# Patient Record
Sex: FEMALE | Race: BLACK OR AFRICAN AMERICAN | Employment: UNEMPLOYED | RURAL
[De-identification: names, ages, dates, MRNs, and addresses within clinical notes are randomized per-mention and may not be internally consistent; named-entity substitution may affect disease eponyms.]

---

## 2019-04-29 ENCOUNTER — OFFICE VISIT (OUTPATIENT)
Dept: FAMILY MEDICINE CLINIC | Age: 54
End: 2019-04-29

## 2019-04-29 VITALS
HEIGHT: 68 IN | HEART RATE: 68 BPM | SYSTOLIC BLOOD PRESSURE: 154 MMHG | BODY MASS INDEX: 35.61 KG/M2 | DIASTOLIC BLOOD PRESSURE: 92 MMHG | TEMPERATURE: 97.9 F | OXYGEN SATURATION: 98 % | WEIGHT: 235 LBS | RESPIRATION RATE: 16 BRPM

## 2019-04-29 DIAGNOSIS — R01.1 HEART MURMUR: ICD-10-CM

## 2019-04-29 DIAGNOSIS — I10 ESSENTIAL HYPERTENSION: Primary | ICD-10-CM

## 2019-04-29 DIAGNOSIS — Z12.39 SCREENING FOR MALIGNANT NEOPLASM OF BREAST: ICD-10-CM

## 2019-04-29 DIAGNOSIS — Z12.11 SCREENING FOR MALIGNANT NEOPLASM OF COLON: ICD-10-CM

## 2019-04-29 DIAGNOSIS — G56.01 CARPAL TUNNEL SYNDROME OF RIGHT WRIST: ICD-10-CM

## 2019-04-29 PROBLEM — E66.01 SEVERE OBESITY (HCC): Status: ACTIVE | Noted: 2019-04-29

## 2019-04-29 RX ORDER — AMLODIPINE BESYLATE 5 MG/1
5 TABLET ORAL DAILY
Qty: 30 TAB | Refills: 3 | Status: SHIPPED | OUTPATIENT
Start: 2019-04-29 | End: 2019-05-10 | Stop reason: SINTOL

## 2019-04-29 RX ORDER — GLUCOSAMINE SULFATE 1500 MG
POWDER IN PACKET (EA) ORAL DAILY
COMMUNITY
Start: 2019-04-29

## 2019-04-29 NOTE — PATIENT INSTRUCTIONS
Lorrie Goodman with [facility]  [address] [place] [address], [facility] [address] [facility], [facility]  [phone]    Monitor blood pressure outside the office several times weekly at different times during the day and evening. Bring the record to me in 3 weeks for review. Blood Pressure Record     Patient Name:  ______________________ :  ______________________    Date/Time BP Reading Pulse                                                                                                                                                                                           Carpal Tunnel Syndrome: Care Instructions  Your Care Instructions    Carpal tunnel syndrome is a nerve problem. It can cause tingling, numbness, weakness, or pain in the fingers, thumb, and hand. The median nerve and several tough tissues called tendons run through a space in the wrist called the carpal tunnel. The repeated hand motions used in work and some hobbies and sports can put pressure on the nerve. Pregnancy and several conditions, including diabetes, arthritis, and an underactive thyroid, also can cause carpal tunnel syndrome. You may be able to limit an activity or do it differently to reduce your symptoms. You also can take other steps to feel better. If your symptoms are mild, 1 to 2 weeks of home treatment are likely to ease your pain. Surgery is needed only if other treatments do not work. Follow-up care is a key part of your treatment and safety. Be sure to make and go to all appointments, and call your doctor if you are having problems. It's also a good idea to know your test results and keep a list of the medicines you take. How can you care for yourself at home? · If possible, stop or reduce the activity that causes your symptoms. If you cannot stop the activity, take frequent breaks to rest and stretch or change hand positions to do a task.  Try switching hands, such as when using a computer mouse. · Try to avoid bending or twisting your wrists. · Ask your doctor if you can take an over-the-counter pain medicine, such as acetaminophen (Tylenol), ibuprofen (Advil, Motrin), or naproxen (Aleve). Be safe with medicines. Read and follow all instructions on the label. · If your doctor prescribes corticosteroid medicine to help reduce pain and swelling, take it exactly as prescribed. Call your doctor if you think you are having a problem with your medicine. · Put ice or a cold pack on your wrist for 10 to 20 minutes at a time to ease pain. Put a thin cloth between the ice and your skin. · If your doctor or your physical or occupational therapist tells you to wear a wrist splint, wear it as directed to keep your wrist in a neutral position. This also eases pressure on your median nerve. · Ask your doctor whether you should have physical or occupational therapy to learn how to do tasks differently. · Try a yoga class to stretch your muscles and build strength in your hands and wrists. Yoga has been shown to ease carpal tunnel symptoms. To prevent carpal tunnel  · When working at a computer, keep your hands and wrists in line with your forearms. Hold your elbows close to your sides. Take a break every 10 to 15 minutes. · Try these exercises:  ? Warm up: Rotate your wrist up, down, and from side to side. Repeat this 4 times. Stretch your fingers far apart, relax them, then stretch them again. Repeat 4 times. Stretch your thumb by pulling it back gently, holding it, and then releasing it. Repeat 4 times. ? Prayer stretch: Start with your palms together in front of your chest just below your chin. Slowly lower your hands toward your waistline while keeping your hands close to your stomach and your palms together until you feel a mild to moderate stretch under your forearms. Hold for 10 to 20 seconds. Repeat 4 times. ? Wrist flexor stretch: Hold your arm in front of you with your palm up.  [employer] your wrist, pointing your hand toward the floor. With your other hand, gently bend your wrist further until you feel a mild to moderate stretch in your forearm. Hold for 10 to 20 seconds. Repeat 4 times. ? Wrist extensor stretch: Repeat the steps for the wrist flexor stretch, but begin with your extended hand palm down. · Squeeze a rubber exercise ball several times a day to keep your hands and fingers strong. · Avoid holding objects (such as a book) in one position for a long time. When possible, use your whole hand to grasp an object. Using just the thumb and index finger can put stress on the wrist.  · Do not smoke. It can make this condition worse by reducing blood flow to the median nerve. If you need help quitting, talk to your doctor about stop-smoking programs and medicines. These can increase your chances of quitting for good. When should you call for help? Watch closely for changes in your health, and be sure to contact your doctor if:    · Your pain or other problems do not get better with home care.     · You want more information about physical or occupational therapy.     · You have side effects of your corticosteroid medicine, such as:  ? Weight gain. ? Mood changes. ? Trouble sleeping. ? Bruising easily.     · You have any other problems with your medicine. Where can you learn more? Go to http://eleanor-amor.info/. Enter R432 in the search box to learn more about \"Carpal Tunnel Syndrome: Care Instructions. \"  Current as of: September 20, 2018  Content Version: 11.9  © 7042-3056 [employer]. Care instructions adapted under license by [employer] (which disclaims liability or warranty for this information). If you have questions about a medical condition or this instruction, always ask your healthcare professional. [place] [address] any warranty or liability for your use of this information.

## 2019-04-29 NOTE — PROGRESS NOTES
CC: New patient, establish [facility]. HPI: Pt is a 53 y.o. female who presents for new patient, [facility]. She has noticed since moving back to [facility], her BP has been up. Values have been ranging in the 334'S systolic and she is unsure of the diastolics. She has a heart murmur that is related to a type of defect in the heart. She was followed by Dr. Kay Portillo [facility] for this but has not seen Cardiology in a long time. She has not had any symptoms of chest pain, SOB, leg swelling or orthopnea, but is requesting to see a Cardiologist just to establish with one here. She has also been having numbness/tingling in the right hand for the past 2 weeks. Symptoms eventually get better when she shakes the hand out. She has been lifting her father which is a new activity for her since moving home. She has noticed some weakness of the hand and trouble opening jars along with this. 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 History reviewed. No pertinent past medical history. No family history on file. Social History     Tobacco Use    Smoking status: Current Every Day Smoker    Smokeless tobacco: Never Used   Substance Use Topics    Alcohol use:  Yes    Drug use: Yes     Types: Marijuana       ROS:  Positive only when bolded  Constitutional: F/C, changes in weight (pt reports slow weight gain)  Eyes: Itching/draining, changes in vision (occ blurry vision that clears in a few seconds)  Ears, nose, mouth, throat, and face: Rhinorrhea, congestion (allergies)  Respiratory: SOB  Cardiovascular: CP, GREGORY  Integument/breast: Changes in skin, moles or hair, rash  Hematologic/lymphatic: SIMONE  Musculoskeletal: Myalgias, arthralgias (back, with lifting)  Neurological: Changes in gait, numbness/tingling  Psychiatric: Changes in mood    PE:  Visit Vitals  BP (!) 154/92 (BP 1 Location: Left arm, BP Patient Position: Sitting)   Pulse 68   Temp 97.9 °F (36.6 °C) (Oral)   Resp 16   Ht 5' 7.5\" (1.715 m)   Wt 235 lb (106.6 kg)   SpO2 98%   BMI 36.26 kg/m²     Gen: Pt sitting in chair, in NAD  Head: Normocephalic, atraumatic  Eyes: Sclera anicteric, EOM grossly intact, PERRL  Throat: MMM, normal lips, tongue and gums  Neck: Supple, no LAD, no thyromegaly or carotid bruits  CVS: Normal S1, S2, no m/r/g  Resp: CTAB, no wheezes or rales  Extrem: Atraumatic, no cyanosis or edema  MSK: Full AROM of wrists b/l. No muscle wasting of thenar components. Negative Phalen's and Tinnel's test on R.   Pulses: 2+   Skin: Warm, dry  Neuro: Alert, oriented, appropriate      A/P: Pt is a 53 y.o. female who presents for new patient, elevated BP and numbness/tingling in wrist, likely carpal tunnel syndrome  - Given elevated BP here and at home, will start medication. Discussed options and mutual decision made to start amlodipine 5mg daily  - Pt to continue working on diet and lifestyle modifications to bring down BP  - Brace given for carpal tunnel  - NSAIDs to help with pain and inflammation at the carpal tunnel  - Referral to Cardiology per her request  - Lipid panel  - CMP  - TSH  - Will request records from prior Cardiologist in [place] [address]  - Referral for mammogram  - FIT as pt not able to do colonoscopy now while caring for elderly parents  - RTC in 2 weeks for BP check and [address] with pap      Discussed diagnoses in detail with patient. Medication risks/benefits/side effects discussed with patient. All of the patient's questions were addressed. The patient understands and agrees with our plan of care. The patient knows to call back if they are unsure of or forget any changes we discussed today or if the symptoms change. The patient received an After-Visit Summary which contains VS, orders, medication list and allergy list. This can be used as a \"mini-medical record\" should they have to seek medical care while out of town.     No current outpatient medications on file prior to visit. No current facility-administered medications on file prior to visit.

## 2019-04-30 ENCOUNTER — TELEPHONE (OUTPATIENT)
Dept: FAMILY MEDICINE CLINIC | Age: 54
End: 2019-04-30

## 2019-04-30 LAB
ALBUMIN SERPL-MCNC: 4.1 G/DL (ref 3.5–5.5)
ALBUMIN/GLOB SERPL: 1.5 {RATIO} (ref 1.2–2.2)
ALP SERPL-CCNC: 65 IU/L (ref 39–117)
ALT SERPL-CCNC: 15 IU/L (ref 0–32)
AST SERPL-CCNC: 15 IU/L (ref 0–40)
BILIRUB SERPL-MCNC: 0.3 MG/DL (ref 0–1.2)
BUN SERPL-MCNC: 8 MG/DL (ref 6–24)
BUN/CREAT SERPL: 10 (ref 9–23)
CALCIUM SERPL-MCNC: 9.3 MG/DL (ref 8.7–10.2)
CHLORIDE SERPL-SCNC: 104 MMOL/L (ref 96–106)
CHOLEST SERPL-MCNC: 202 MG/DL (ref 100–199)
CO2 SERPL-SCNC: 24 MMOL/L (ref 20–29)
CREAT SERPL-MCNC: 0.83 MG/DL (ref 0.57–1)
GLOBULIN SER CALC-MCNC: 2.7 G/DL (ref 1.5–4.5)
GLUCOSE SERPL-MCNC: 90 MG/DL (ref 65–99)
HDLC SERPL-MCNC: 49 MG/DL
LDLC SERPL CALC-MCNC: 133 MG/DL (ref 0–99)
POTASSIUM SERPL-SCNC: 4.4 MMOL/L (ref 3.5–5.2)
PROT SERPL-MCNC: 6.8 G/DL (ref 6–8.5)
SODIUM SERPL-SCNC: 139 MMOL/L (ref 134–144)
TRIGL SERPL-MCNC: 101 MG/DL (ref 0–149)
TSH SERPL DL<=0.005 MIU/L-ACNC: 1.55 UIU/ML (ref 0.45–4.5)
VLDLC SERPL CALC-MCNC: 20 MG/DL (ref 5–40)

## 2019-04-30 NOTE — TELEPHONE ENCOUNTER
Pt returned call, she would like to do lifestyle and diet modifications for the next 3-6 months and then repeat lipid panel. All questions answered and pt feels comfortable with the plan of care.

## 2019-04-30 NOTE — TELEPHONE ENCOUNTER
Called to inform pt of recent results. Left message to call back. Kidney function, liver function, thyroid and electrolytes normal. Cholesterol elevated and 10 year ASCVD risk is 16%. Recommend intensive diet and lifestyle modifications vs starting statin. Will discuss with pt when she calls back.

## 2019-05-06 LAB — HEMOCCULT STL QL IA: NEGATIVE

## 2019-05-09 ENCOUNTER — TELEPHONE (OUTPATIENT)
Dept: FAMILY MEDICINE CLINIC | Age: 54
End: 2019-05-09

## 2019-05-09 NOTE — TELEPHONE ENCOUNTER
Pt requesting to speak w/ provider regarding her BP medication. She states that she feels really sick and she has been on them for 8 days.

## 2019-05-09 NOTE — TELEPHONE ENCOUNTER
Spoke with patient and she states since she has been taking the new blood pressure medication (amlodipine) she has been having a headache, nausea and chest pressure (worse at night). She has an appointment with Dr. Jaime Alvarez on 5/13/19 and does not want to stop the medication without letting her know. Please advise.

## 2019-05-10 NOTE — TELEPHONE ENCOUNTER
Returned pt's call. She can stop taking the amlodipine. Will discuss further options at her appt on Monday. All questions answered and pt feels comfortable with the plan of care.

## 2019-05-13 ENCOUNTER — HOSPITAL ENCOUNTER (OUTPATIENT)
Dept: LAB | Age: 54
Discharge: HOME | End: 2019-05-13
Payer: MEDICAID

## 2019-05-13 ENCOUNTER — TELEPHONE (OUTPATIENT)
Dept: FAMILY MEDICINE CLINIC | Age: 54
End: 2019-05-13

## 2019-05-13 ENCOUNTER — OFFICE VISIT (OUTPATIENT)
Dept: FAMILY MEDICINE CLINIC | Age: 54
End: 2019-05-13

## 2019-05-13 VITALS
HEART RATE: 72 BPM | TEMPERATURE: 97.3 F | DIASTOLIC BLOOD PRESSURE: 90 MMHG | RESPIRATION RATE: 16 BRPM | SYSTOLIC BLOOD PRESSURE: 145 MMHG | HEIGHT: 68 IN | OXYGEN SATURATION: 96 % | BODY MASS INDEX: 34.71 KG/M2 | WEIGHT: 229 LBS

## 2019-05-13 DIAGNOSIS — I10 ESSENTIAL HYPERTENSION: ICD-10-CM

## 2019-05-13 DIAGNOSIS — F17.200 TOBACCO DEPENDENCE: ICD-10-CM

## 2019-05-13 DIAGNOSIS — Z01.419 WELL WOMAN EXAM: Primary | ICD-10-CM

## 2019-05-13 PROCEDURE — 88175 CYTOPATH C/V AUTO FLUID REDO: CPT

## 2019-05-13 PROCEDURE — 87624 HPV HI-RISK TYP POOLED RSLT: CPT

## 2019-05-13 RX ORDER — LISINOPRIL 10 MG/1
10 TABLET ORAL DAILY
Qty: 30 TAB | Refills: 2 | Status: SHIPPED | OUTPATIENT
Start: 2019-05-13 | End: 2019-07-31 | Stop reason: SINTOL

## 2019-05-13 RX ORDER — BUPROPION HYDROCHLORIDE 150 MG/1
TABLET, EXTENDED RELEASE ORAL
Qty: 60 TAB | Refills: 3 | Status: SHIPPED | OUTPATIENT
Start: 2019-05-13 | End: 2019-07-31 | Stop reason: SDDI

## 2019-05-13 NOTE — PROGRESS NOTES
CC: MARIAELENA    HPI: Pt is a 53 y.o. female who presents for [facility]. She was started on amlodipine at her last visit but started to have chest pressure, nausea and HA. She stopped taking the amlodipine and the symptoms all resolved. She has been checking BP at home and they have continued to run 130-150's/70-90's per logs. Last pap: Around 2 years ago, no records  History of abnormal paps?: No  Abnormal vaginal bleeding or discharge?: No  Desire to be tested for STDs today?: No  LMP: 2018  Last mammogram: 2 years ago - order placed for [facility] but not scheduled yet  Self breast checks?: Yes  New lumps or bumps?: No  Family history of ovarian, uterine or breast cancer?: Maternal aunt has some type of cancer, possibly vaginal, and maternal 2 great aunts had breast cancer. Pt denies any physical, emotional or verbal abuse. Past Medical History:   Diagnosis Date    Heart murmur     Since birth    HTN (hypertension)        History reviewed. No pertinent family history. Social History     Tobacco Use    Smoking status: Current Every Day Smoker    Smokeless tobacco: Never Used   Substance Use Topics    Alcohol use: Yes    Drug use: Yes     Types: Marijuana         PE:  Visit Vitals  /90 (BP 1 Location: Right arm, BP Patient Position: Sitting)   Pulse 72   Temp 97.3 °F (36.3 °C) (Oral)   Resp 16   Ht 5' 7.5\" (1.715 m)   Wt 229 lb (103.9 kg)   SpO2 96%   BMI 35.34 kg/m²     Gen: Pt sitting in chair, in NAD  Head: Normocephalic, atraumatic  Eyes: Sclera anicteric, EOM grossly intact, PERRL  Throat: MMM, normal lips, tongue, teeth and gums  Neck: Supple, no LAD, no thyromegaly or carotid bruits  CVS: Normal S1, S2, slight end systolic murmur vs louder 2nd heart sound. Resp: CTAB, no wheezes or rales  Abd: Soft, non-tender, non-distended  : Normal external female genitalia. Vaginal mucosa pink, moist. Small  amount clear discharge. Cervix without visible lesions.   Extrem: Atraumatic, no cyanosis or edema  Pulses: 2+   Skin: Warm, dry  Neuro: Alert, oriented, appropriate    A/P: Pt is a 53 y.o. female who presents for [facility] and f/u HTN.  - Pap with HPV. Advised pt that if normal will screen every 5 years  - Discussed options for antihypertensives. She would prefer to avoid diuretics because she already urinates frequently. Will start low dose lisinopril with close f/u for BMP  - Discussed smoking cessation aid options. She would like to do a trial of Wellbutrin  - RTC in 1 month for f/u BP and smoking cessation, or sooner prn if BP's remain elevated after starting lisinopril    The patient was counseled on the dangers of tobacco use, and was advised to quit. Reviewed strategies to maximize success, including pharmacotherapy (Wellbutrin). Discussed diagnoses in detail with patient. Medication risks/benefits/side effects discussed with patient. All of the patient's questions were addressed. The patient understands and agrees with our plan of care. The patient knows to call back if they are unsure of or forget any changes we discussed today or if the symptoms change. The patient received an After-Visit Summary which contains VS, orders, medication list and allergy list. This can be used as a \"mini-medical record\" should they have to seek medical care while out of town. Current Outpatient Medications on File Prior to Visit   Medication Sig Dispense Refill    soy isofla-blk cohosh-mag bark (ESTROVEN) 155 mg cap Take  by mouth.  cholecalciferol (VITAMIN D3) 1,000 unit cap Take  by mouth daily.  calcium carbonate-vitamin D3 (CALTRATE 600 + D) 600 mg (1,500 mg)-800 unit chew Take  by mouth. No current facility-administered medications on file prior to visit.

## 2019-05-13 NOTE — TELEPHONE ENCOUNTER
An order for the patient to have a mammogram has been faxed to [facility] in [place]. Their office will contact the patient to schedule.

## 2019-05-13 NOTE — PROGRESS NOTES
1. Have you been to the ER, urgent care clinic since your last visit? Hospitalized since your last visit? no    2. Have you seen or consulted any other health care providers outside of the [address] [place] [address] since your last visit? Include any pap smears or colon screening. no  Reviewed record in preparation for visit and have obtained necessary documentation. Patient did not bring medications to visit for review. Information provided on Advanced Directive, Living Will. Body mass index is 35.34 kg/m².    Health Maintenance Due   Topic Date Due    Hepatitis C Screening  1965    Pneumococcal 0-64 years (1 of 1 - PPSV23) 04/27/1970    DTaP/Tdap/Td series (1 - Tdap) 04/27/1985    PAP AKA CERVICAL CYTOLOGY  04/27/1985    Shingrix Vaccine Age 50> (1 of 2) 04/27/2014    BREAST CANCER SCRN MAMMOGRAM  04/27/2014

## 2019-05-13 NOTE — PATIENT INSTRUCTIONS
A Healthy Lifestyle: Care Instructions  Your Care Instructions    A healthy lifestyle can help you feel good, stay at a healthy weight, and have plenty of energy for both work and play. A healthy lifestyle is something you can share with your whole family. A healthy lifestyle also can lower your risk for serious health problems, such as high blood pressure, heart disease, and diabetes. You can follow a few steps listed below to improve your health and the health of your family. Follow-up care is a key part of your treatment and safety. Be sure to make and go to all appointments, and call your doctor if you are having problems. It's also a good idea to know your test results and keep a list of the medicines you take. How can you care for yourself at home? · Do not eat too much sugar, fat, or fast foods. You can still have dessert and treats now and then. The goal is moderation. · Start small to improve your eating habits. Pay attention to portion sizes, drink less juice and soda pop, and eat more fruits and vegetables. ? Eat a healthy amount of food. A 3-ounce serving of meat, for example, is about the size of a deck of cards. Fill the rest of your plate with vegetables and whole grains. ? Limit the amount of soda and sports drinks you have every day. Drink more water when you are thirsty. ? Eat at least 5 servings of fruits and vegetables every day. It may seem like a lot, but it is not hard to reach this goal. A serving or helping is 1 piece of fruit, 1 cup of vegetables, or 2 cups of leafy, raw vegetables. Have an apple or some carrot sticks as an afternoon snack instead of a candy bar. Try to have fruits and/or vegetables at every meal.  · Make exercise part of your daily routine. You may want to start with simple activities, such as walking, bicycling, or slow swimming. Try to be active 30 to 60 minutes every day. You do not need to do all 30 to 60 minutes all at once.  For example, you can exercise 3 times a day for 10 or 20 minutes. Moderate exercise is safe for most people, but it is always a good idea to talk to your doctor before starting an exercise program.  · Keep moving. Gilberto Zoran the lawn, work in the garden, or [employer]. Take the stairs instead of the elevator at work. · If you smoke, quit. People who smoke have an increased risk for heart attack, stroke, cancer, and other lung illnesses. Quitting is hard, but there are ways to boost your chance of quitting tobacco for good. ? Use nicotine gum, patches, or lozenges. ? Ask your doctor about stop-smoking programs and medicines. ? Keep trying. In addition to reducing your risk of diseases in the future, you will notice some benefits soon after you stop using tobacco. If you have shortness of breath or asthma symptoms, they will likely get better within a few weeks after you quit. · Limit how much alcohol you drink. Moderate amounts of alcohol (up to 2 drinks a day for men, 1 drink a day for women) are okay. But drinking too much can lead to liver problems, high blood pressure, and other health problems. Family health  If you have a family, there are many things you can do together to improve your health. · Eat meals together as a family as often as possible. · Eat healthy foods. This includes fruits, vegetables, lean meats and dairy, and whole grains. · Include your family in your fitness plan. Most people think of activities such as jogging or tennis as the way to fitness, but there are many ways you and your family can be more active. Anything that makes you breathe hard and gets your heart pumping is exercise. Here are some tips:  ? Walk to do errands or to take your child to school or the bus.  ? Go for a family bike ride after dinner instead of watching TV. Where can you learn more? Go to http://eleanor-amor.info/. Enter Z951 in the search box to learn more about \"A Healthy Lifestyle: Care Instructions. \"  Current as of: September 11, 2018  Content Version: 11.9  © 1342-0990 [employer]. Care instructions adapted under license by [employer] (which disclaims liability or warranty for this information). If you have questions about a medical condition or this instruction, always ask your healthcare professional. [address] any warranty or liability for your use of this information.

## 2019-07-31 ENCOUNTER — OFFICE VISIT (OUTPATIENT)
Dept: CARDIOLOGY CLINIC | Age: 54
End: 2019-07-31

## 2019-07-31 ENCOUNTER — TELEPHONE (OUTPATIENT)
Dept: CARDIOLOGY CLINIC | Age: 54
End: 2019-07-31

## 2019-07-31 VITALS
HEIGHT: 68 IN | SYSTOLIC BLOOD PRESSURE: 144 MMHG | BODY MASS INDEX: 34.25 KG/M2 | HEART RATE: 75 BPM | TEMPERATURE: 98.5 F | DIASTOLIC BLOOD PRESSURE: 81 MMHG | RESPIRATION RATE: 16 BRPM | WEIGHT: 226 LBS | OXYGEN SATURATION: 97 %

## 2019-07-31 DIAGNOSIS — R06.02 SOB (SHORTNESS OF BREATH) ON EXERTION: ICD-10-CM

## 2019-07-31 DIAGNOSIS — I10 HYPERTENSION, ESSENTIAL: ICD-10-CM

## 2019-07-31 DIAGNOSIS — R00.2 HEART PALPITATIONS: ICD-10-CM

## 2019-07-31 DIAGNOSIS — R01.1 SYSTOLIC MURMUR: Primary | ICD-10-CM

## 2019-07-31 DIAGNOSIS — R53.83 FATIGUE, UNSPECIFIED TYPE: ICD-10-CM

## 2019-07-31 RX ORDER — ATENOLOL 25 MG/1
25 TABLET ORAL DAILY
Qty: 30 TAB | Refills: 0 | Status: SHIPPED | OUTPATIENT
Start: 2019-07-31 | End: 2019-09-01 | Stop reason: SDUPTHER

## 2019-07-31 NOTE — TELEPHONE ENCOUNTER
Attempted to reach patient by telephone to schedule echo at [facility] per Dr. Estiven Pena. A message was left for return call. Verified patient with two types of identifiers. Patient returned my call. Scheduled echo at [facility]. Patient verbalized understanding and will call with any other questions.

## 2019-07-31 NOTE — PROGRESS NOTES
1. Have you been to the ER, urgent care clinic since your last visit? Hospitalized since your last visit? No    2. Have you seen or consulted any other health care providers outside of the [address] [place] [address] since your last visit? Include any pap smears or colon screening.  No  Reviewed record in preparation for visit and have necessary documentation  Pt did not bring medication to office visit for review    Goals that were addressed and/or need to be completed during or after this appointment include   Health Maintenance Due   Topic Date Due    Hepatitis C Screening  1965    Pneumococcal 0-64 years (1 of 1 - PPSV23) 04/27/1970    DTaP/Tdap/Td series (1 - Tdap) 04/27/1985    Shingrix Vaccine Age 50> (1 of 2) 04/27/2014

## 2019-07-31 NOTE — PATIENT INSTRUCTIONS
Jillian Gallagher MD    [address]  Our office will call you to schedule an echocardiogram, if you do not hear back by Monday please call our office. Lorrie Goodman with [facility]  [address] [place] [address] Drive, [address] [facility], [address] [facility]  [phone]    Monitor blood pressure outside the office several times weekly at different times during the day and evening. Bring the record to me in 3 weeks for review. Blood Pressure Record     Patient Name:  ______________________ :  ______________________    Date/Time BP Reading Pulse                                                                                                                                                                                                Home Blood Pressure Test: About This Test  What is it? A home blood pressure test allows you to keep track of your blood pressure at home. Blood pressure is a measure of the force of blood against the walls of your arteries. Blood pressure readings include two numbers, such as 130/80 (say \"130 over 80\"). The first number is the systolic pressure. The second number is the diastolic pressure. Why is this test done? You may do this test at home to:  · Find out if you have high blood pressure. · Track your blood pressure if you have high blood pressure. · Track how well medicine is working to reduce high blood pressure. · Check how lifestyle changes, such as weight loss and exercise, are affecting blood pressure. How can you prepare for the test?  · Do not use caffeine, tobacco, or medicines known to raise blood pressure (such as nasal decongestant sprays) for at least 30 minutes before taking your blood pressure. · Do not exercise for at least 30 minutes before taking your blood pressure. What happens before the test?  Take your blood pressure while you feel comfortable and relaxed.  Sit quietly with both feet on the floor for at least 5 minutes before the test.  What happens during the test?  · Sit with your arm slightly bent and resting on a table so that your upper arm is at the same level as your heart. · Roll up your sleeve or take off your shirt to expose your upper arm. · Wrap the blood pressure cuff around your upper arm so that the lower edge of the cuff is about 1 inch above the bend of your elbow. Proceed with the following steps depending on if you are using an automatic or manual pressure monitor. Automatic blood pressure monitors  · Press the on/off button on the automatic monitor and wait until the ready-to-measure \"heart\" symbol appears next to zero in the display window. · Press the start button. The cuff will inflate and deflate by itself. · Your blood pressure numbers will appear on the screen. · Write your numbers in your log book, along with the date and time. Manual blood pressure monitors  · Place the earpieces of a stethoscope in your ears, and place the bell of the stethoscope over the artery, just below the cuff. · Close the valve on the rubber inflating bulb. · Squeeze the bulb rapidly with your opposite hand to inflate the cuff until the dial or column of mercury reads about 30 mm Hg higher than your usual systolic pressure. If you do not know your usual pressure, inflate the cuff to 210 mm Hg or until the pulse at your wrist disappears. · Open the pressure valve just slightly by twisting or pressing the valve on the bulb. · As you watch the pressure slowly fall, note the level on the dial at which you first start to hear a pulsing or tapping sound through the stethoscope. This is your systolic blood pressure. · Continue letting the air out slowly. The sounds will become muffled and will finally disappear. Note the pressure when the sounds completely disappear. This is your diastolic blood pressure. Let out all the remaining air.   · Write your numbers in your log book, along with the date and time. What else should you know about the test?  It is more accurate to take the average of several readings made throughout the day than to rely on a single reading. It's normal for blood pressure to go up and down throughout the day. Follow-up care is a key part of your treatment and safety. Be sure to make and go to all appointments, and call your doctor if you are having problems. It's also a good idea to keep a list of the medicines you take. Where can you learn more? Go to http://eleanor-amor.info/. Enter C427 in the search box to learn more about \"Home Blood Pressure Test: About This Test.\"  Current as of: July 22, 2018  Content Version: 12.1  © 5581-9323 [employer]wise, [employer]. Care instructions adapted under license by [employer] (which disclaims liability or warranty for this information). If you have questions about a medical condition or this instruction, always ask your healthcare professional. [address] any warranty or liability for your use of this information.
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
## 2019-08-22 ENCOUNTER — TELEPHONE (OUTPATIENT)
Dept: FAMILY MEDICINE CLINIC | Age: 54
End: 2019-08-22

## 2019-08-22 DIAGNOSIS — Z87.74 ADULT PATIENT WITH HISTORY OF CONGENITAL HEART DISEASE: Primary | ICD-10-CM

## 2019-08-22 NOTE — TELEPHONE ENCOUNTER
Telephone Encounter        Caller's first/last name:  Self      Relationship to patient and are they on HIPAA:  Self      Did you check for a duplicate Encounter? Yes      Reason for call:  Request for medication      Further clarification of call: Pt states that she is schedule to have a dental procedure done on Monday. She needs a Rx for Amoxicillin sent to [facility] in [place]. Pt states that she has a heart murmur and unable to do any dental procedures w/out Amoxicillin. Does caller want a return call? Yes, when Rx has been sent to pharmacy.     Best contact number:  [phone]

## 2019-08-23 PROBLEM — Z87.74 ADULT PATIENT WITH HISTORY OF CONGENITAL HEART DISEASE: Status: ACTIVE | Noted: 2019-08-23

## 2019-08-23 RX ORDER — AMOXICILLIN 500 MG/1
2000 CAPSULE ORAL ONCE
Qty: 4 CAP | Refills: 3 | Status: SHIPPED | OUTPATIENT
Start: 2019-08-23 | End: 2019-12-04 | Stop reason: SDUPTHER

## 2019-08-23 NOTE — TELEPHONE ENCOUNTER
Returned pt's call. 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 She is going to have a dental procedure on Monday and has always had to take 2000mg of amoxicillin prior to dental procedures in the past. Discussed current recommendations for who requires antibiotic ppx prior to dental procedures and agreed that given her somewhat unclear history, will err on the side of caution and use ppx. Will send in rx to pt's pharmacy. All questions answered and pt feels comfortable with the plan of care.

## 2019-09-03 RX ORDER — ATENOLOL 25 MG/1
25 TABLET ORAL DAILY
Qty: 90 TAB | Refills: 0 | Status: SHIPPED | OUTPATIENT
Start: 2019-09-03 | End: 2019-09-30 | Stop reason: SDUPTHER

## 2019-09-03 NOTE — TELEPHONE ENCOUNTER
Request for atenolol 25mg daily. Last office visit 7-31-19, next office visit needs to be scheduled.  Refills per verbal order from Dr. Tay Dhaliwal.

## 2019-09-05 ENCOUNTER — OFFICE VISIT (OUTPATIENT)
Dept: FAMILY MEDICINE CLINIC | Age: 54
End: 2019-09-05

## 2019-09-05 VITALS
WEIGHT: 232 LBS | SYSTOLIC BLOOD PRESSURE: 138 MMHG | TEMPERATURE: 98.2 F | DIASTOLIC BLOOD PRESSURE: 80 MMHG | OXYGEN SATURATION: 100 % | RESPIRATION RATE: 16 BRPM | HEART RATE: 58 BPM | HEIGHT: 67 IN | BODY MASS INDEX: 36.41 KG/M2

## 2019-09-05 DIAGNOSIS — Z11.59 NEED FOR HEPATITIS C SCREENING TEST: ICD-10-CM

## 2019-09-05 DIAGNOSIS — Z23 ENCOUNTER FOR IMMUNIZATION: ICD-10-CM

## 2019-09-05 DIAGNOSIS — N89.8 VAGINAL DRYNESS: Primary | ICD-10-CM

## 2019-09-05 DIAGNOSIS — I10 ESSENTIAL HYPERTENSION: ICD-10-CM

## 2019-09-05 DIAGNOSIS — E78.2 MIXED HYPERLIPIDEMIA: ICD-10-CM

## 2019-09-05 RX ORDER — ESTRADIOL 0.1 MG/G
CREAM VAGINAL
Qty: 42.5 G | Refills: 3 | Status: SHIPPED | OUTPATIENT
Start: 2019-09-05 | End: 2020-02-03

## 2019-09-05 NOTE — PATIENT INSTRUCTIONS
Donepezil (By mouth)   Donepezil (hiv-NVX-a-zil)  Treats Alzheimer disease. Brand Name(s): Aricept   There may be other brand names for this medicine. When This Medicine Should Not Be Used: This medicine is not right for everyone. Do not use it if you had an allergic reaction to donepezil or to medicines that contain piperidine. How to Use This Medicine:   Tablet, Dissolving Tablet  · Your doctor will tell you how much medicine to use. Do not use more than directed. · Read and follow the patient instructions that come with this medicine. Talk to your doctor or pharmacist if you have any questions. · Tablet: Swallow the 23-mg tablet whole. Do not crush, break, or chew it. · Disintegrating tablet:Make sure your hands are dry before you handle the disintegrating tablet. Peel back the foil from the blister pack, then remove the tablet. Do not push the tablet through the foil. Place the tablet in your mouth. After it has melted, swallow or take a drink of water. · Missed dose: Skip the missed dose and take your next dose at the regular time. Do not take extra medicine to make up for a missed dose. · Store the medicine in a closed container at room temperature, away from heat, moisture, and direct light. Drugs and Foods to Avoid:   Ask your doctor or pharmacist before using any other medicine, including over-the-counter medicines, vitamins, and herbal products. · Some medicines can affect how donepezil works. Tell your doctor if you are using any of the following:   ¨ Bethanechol, carbamazepine, dexamethasone, ketoconazole, phenobarbital, phenytoin, quinidine, or rifampin  ¨ NSAID pain or arthritis medicine (such as aspirin, diclofenac, ibuprofen, naproxen)  Warnings While Using This Medicine:   · Tell your doctor if you are pregnant or breastfeeding, or if you have heart disease, lung disease, asthma or other breathing problems, stomach ulcers or bleeding, or trouble urinating.   · This medicine may cause the following problems:   ¨ Slow heartbeat  ¨ Stomach or bowel bleeding  ¨ Seizures  · Tell any doctor or dentist who treats you that you are using this medicine. You may need to stop using this medicine several days before you have surgery or medical tests. · Your doctor will check your progress and the effects of this medicine at regular visits. Keep all appointments. · Keep all medicine out of the reach of children. Never share your medicine with anyone. Possible Side Effects While Using This Medicine:   Call your doctor right away if you notice any of these side effects:  · Allergic reaction: Itching or hives, swelling in your face or hands, swelling or tingling in your mouth or throat, chest tightness, trouble breathing  · Bloody or black, tarry stools  · Change in how much or how often you urinate  · Chest pain, slow or uneven heartbeat, trouble breathing  · Lightheadedness, dizziness, fainting  · Seizures  · Severe stomach pain  · Unusual bleeding, bruising, or weakness  · Vomiting of blood or material that looks like coffee grounds  If you notice these less serious side effects, talk with your doctor:   · Mild nausea, vomiting, or diarrhea  · Weight loss  If you notice other side effects that you think are caused by this medicine, tell your doctor. Call your doctor for medical advice about side effects. You may report side effects to FDA at 4-038-EOW-5100  © 2017 [facility] Information is for End User's use only and may not be sold, redistributed or otherwise used for commercial purposes. The above information is an  only. It is not intended as medical advice for individual conditions or treatments. Talk to your doctor, nurse or pharmacist before following any medical regimen to see if it is safe and effective for you.

## 2019-09-05 NOTE — PROGRESS NOTES
1. Have you been to the ER, urgent care clinic since your last visit? Hospitalized since your last visit? no    2. Have you seen or consulted any other health care providers outside of the [address] [place] [address] since your last visit? Include any pap smears or colon screening. no  Reviewed record in preparation for visit and have obtained necessary documentation. Patient did not bring medications to visit for review. Information provided on Advanced Directive, Living Will. Body mass index is 36.34 kg/m².    Health Maintenance Due   Topic Date Due    Hepatitis C Screening  1965    Pneumococcal 0-64 years (1 of 1 - PPSV23) 04/27/1970    DTaP/Tdap/Td series (1 - Tdap) 04/27/1985    Shingrix Vaccine Age 50> (1 of 2) 04/27/2014    Influenza Age 5 to Adult  08/01/2019

## 2019-09-05 NOTE — PROGRESS NOTES
CC: Menopausal symptoms and f/u HTN    HPI: Pt is a 53 y.o. female who presents for menopausal symptoms and f/u HTN. She has an unclear h/o congenital heart defect and murmur, and had been having occasional palpitations, so was sent to Cardiology at her last visit. She had also had some side effects to first amlodipine and then lisinopril. She saw Cards on 7/31, notes personally reviewed. They started her on atenolol for BP and palpitations. They ordered and echo, which I reviewed with the patient today. It showed trace regurgitation at the mitral and tricuspid valves, with normal EF and slightly dilated L atrium. Since starting the atenolol she has not checked her BP but reports that the palpitations have resolved and she is tolerating the medication well. She also reports an increase in hot flashes and vaginal dryness. She had been taking an OTC supplement similar to [place] which was really helping for several months but lately does not seem to help anymore. She has never taken oral hormone replacement and is wary of taking any pills as she has had bad reactions to several (see above). She tried Wellbutrin once as well to help with smoking cessation, but shortly after that states she ended up in the ER very sick and they thought it may have been related to the Wellbutrin. We don't have records from this visit. Her last lipid panel was elevated and the plan at that time was to work on diet and lifestyle modifications and repeat in 3-6 months. Lab Results   Component Value Date/Time    Cholesterol, total 202 (H) 04/29/2019 02:21 PM    HDL Cholesterol 49 04/29/2019 02:21 PM    LDL, calculated 133 (H) 04/29/2019 02:21 PM    VLDL, calculated 20 04/29/2019 02:21 PM    Triglyceride 101 04/29/2019 02:21 PM       Past Medical History:   Diagnosis Date    Heart murmur     Since birth    HTN (hypertension)        History reviewed. No pertinent family history.     Social History     Tobacco Use    Smoking status: Current Every Day Smoker    Smokeless tobacco: Never Used   Substance Use Topics    Alcohol use: Yes    Drug use: Yes     Types: Marijuana       ROS:  Positive only when bolded  Constitutional: F/C  Eyes: Changes in vision  Ears, nose, mouth, throat, and face: Rhinorrhea, congestion  Respiratory: Cough  Cardiovascular: Palpitations  Genitourinary: Vaginal dryness  Integument/breast: Changes in skin  Endocrine: Hot flashes  Hematologic/lymphatic: SIMONE  Neurological: Changes in gait  Psychiatric: Changes in mood (more short tempered lately)    PE:  Visit Vitals  /80 (BP 1 Location: Left arm, BP Patient Position: Sitting)   Pulse (!) 58   Temp 98.2 °F (36.8 °C) (Oral)   Resp 16   Ht 5' 7\" (1.702 m)   Wt 232 lb (105.2 kg)   SpO2 100%   BMI 36.34 kg/m²     Gen: Pt sitting in chair, in NAD  Head: Normocephalic, atraumatic  Eyes: Sclera anicteric, EOM grossly intact, PERRL  Throat: MMM, normal lips, tongue and gums  Neck: Supple, no LAD, no thyromegaly or carotid bruits  CVS: Normal S1, S2, no m/r/g  Resp: CTAB, no wheezes or rales  Extrem: Atraumatic, no cyanosis or edema  Pulses: 2+   Skin: Warm, dry  Neuro: Alert, oriented, appropriate      A/P:   Encounter Diagnoses     ICD-10-CM ICD-9-CM   1. Vaginal dryness N89.8 625.8   2. Encounter for immunization Z23 V03.89   3. Essential hypertension I10 401.9   4. Need for hepatitis C screening test Z11.59 V73.89   5. Mixed hyperlipidemia E78.2 272.2     1. Vaginal dryness/menopausal symptoms: Discussed options with pt, she would prefer to stick with the vaginal estrogen cream for now to help with dryness and work on lifestyle modifications to help with the hot flashes.   - estradiol (ESTRACE) 0.01 % (0.1 mg/gram) vaginal cream; Use 2g intravaginally daily for the first two weeks. Then reduce to 1g intravaginally daily. Dispense: 42.5 g; Refill: 3    2.  Encounter for immunization  - diph,Pertuss,Acell,,Tet Vac-PF (ADACEL) 2 Lf-(2.5-5-3-5 mcg)-5Lf/0.5 mL susp; 0.5 mL by IntraMUSCular route once for 1 dose. Dispense: 1 Syringe; Refill: 0  - pneumococcal 23-valent (PNEUMOVAX 23) 25 mcg/0.5 mL injection; 0.5 mL by IntraMUSCular route once for 1 dose. Dispense: 0.5 mL; Refill: 0  - varicella-zoster recombinant, PF, (SHINGRIX, PF,) 50 mcg/0.5 mL susr injection; 0.5 mL by IntraMUSCular route once for 1 dose. Dispense: 0.5 mL; Refill: 0  - ADMIN INFLUENZA VIRUS VAC  - INFLUENZA VIRUS VAC QUAD,SPLIT,PRESV FREE SYRINGE IM    3. Essential hypertension: BP looks good today on atenolol and pt tolerating the medication well. Will leave at current dose for now  - METABOLIC PANEL, BASIC    4. Need for hepatitis C screening test  - HEPATITIS C AB    5. Mixed hyperlipidemia: If still with ASCVD risk >10% will call to discuss options  - LIPID PANEL     RTC in 6 months for f/u chronic conditions, or sooner prn    Discussed diagnoses in detail with patient. Medication risks/benefits/side effects discussed with patient. All of the patient's questions were addressed. The patient understands and agrees with our plan of care. The patient knows to call back if they are unsure of or forget any changes we discussed today or if the symptoms change. The patient received an After-Visit Summary which contains VS, orders, medication list and allergy list. This can be used as a \"mini-medical record\" should they have to seek medical care while out of town. Current Outpatient Medications on File Prior to Visit   Medication Sig Dispense Refill    atenolol (TENORMIN) 25 mg tablet Take 1 Tab by mouth daily. 90 Tab 0    cholecalciferol (VITAMIN D3) 1,000 unit cap Take  by mouth daily.  calcium carbonate-vitamin D3 (CALTRATE 600 + D) 600 mg (1,500 mg)-800 unit chew Take  by mouth. No current facility-administered medications on file prior to visit.

## 2019-09-06 LAB
BUN SERPL-MCNC: 11 MG/DL (ref 6–24)
BUN/CREAT SERPL: 14 (ref 9–23)
CALCIUM SERPL-MCNC: 9.4 MG/DL (ref 8.7–10.2)
CHLORIDE SERPL-SCNC: 103 MMOL/L (ref 96–106)
CHOLEST SERPL-MCNC: 161 MG/DL (ref 100–199)
CO2 SERPL-SCNC: 22 MMOL/L (ref 20–29)
CREAT SERPL-MCNC: 0.78 MG/DL (ref 0.57–1)
GLUCOSE SERPL-MCNC: 83 MG/DL (ref 65–99)
HCV AB S/CO SERPL IA: <0.1 S/CO RATIO (ref 0–0.9)
HDLC SERPL-MCNC: 49 MG/DL
LDLC SERPL CALC-MCNC: 96 MG/DL (ref 0–99)
POTASSIUM SERPL-SCNC: 4.5 MMOL/L (ref 3.5–5.2)
SODIUM SERPL-SCNC: 142 MMOL/L (ref 134–144)
TRIGL SERPL-MCNC: 82 MG/DL (ref 0–149)
VLDLC SERPL CALC-MCNC: 16 MG/DL (ref 5–40)

## 2019-09-30 RX ORDER — ATENOLOL 25 MG/1
25 TABLET ORAL DAILY
Qty: 90 TAB | Refills: 1 | Status: SHIPPED | OUTPATIENT
Start: 2019-09-30 | End: 2020-05-28

## 2019-09-30 NOTE — TELEPHONE ENCOUNTER
Request for atenolol 25mg daily. Last office visit 7-31-19, next office visit needs to be scheduled.  Refills per verbal order from Dr. Miryam Bruno.

## 2019-10-01 ENCOUNTER — TELEPHONE (OUTPATIENT)
Dept: FAMILY MEDICINE CLINIC | Age: 54
End: 2019-10-01

## 2019-10-01 NOTE — TELEPHONE ENCOUNTER
Returned call to pt. Pt made aware that we do not carry the shingles vaccine. Pt advised she could try the health department. Pt given address and number to [facility] dept. If they have the vaccine, pt will get both the shingles and pneumonia there. If not, pt will call back.

## 2019-10-01 NOTE — TELEPHONE ENCOUNTER
Caller's first/last name:  Lexy Pedraza    Reason for call:  immunizations      Does caller want a return call?  yes      Best contact number:  [phone]      Further clarification of call:      Patient called back and stated that she had been to [facility] to get her Pneumonia and Shingles vaccine but was told because of the type of insurance that she has, it has to be done at the doctor's office. Please call her back to discuss at [phone]. Patient states she would like to get these vaccines today.

## 2019-10-23 NOTE — TELEPHONE ENCOUNTER
----- Message from Britni Flores sent at 10/23/2019  2:32 PM EDT -----  Regarding: Dr. Natalia Acevedo Message/Vendor Calls    Caller's first and last name:      Reason for call: Pt stated she is calling about her mothers medications.       Callback required yes/no and why: yes      Best contact number(s): [phone]      Details to clarify the request:      Britni Flores

## 2019-12-04 DIAGNOSIS — Z87.74 ADULT PATIENT WITH HISTORY OF CONGENITAL HEART DISEASE: ICD-10-CM

## 2019-12-04 RX ORDER — AMOXICILLIN 500 MG/1
CAPSULE ORAL
Qty: 4 CAP | Refills: 0 | Status: SHIPPED | OUTPATIENT
Start: 2019-12-04 | End: 2020-02-28

## 2020-02-11 DIAGNOSIS — N95.2 POSTMENOPAUSAL ATROPHIC VAGINITIS: Primary | ICD-10-CM

## 2020-02-27 ENCOUNTER — TELEPHONE (OUTPATIENT)
Dept: FAMILY MEDICINE CLINIC | Age: 55
End: 2020-02-27

## 2020-02-27 DIAGNOSIS — Z87.74 ADULT PATIENT WITH HISTORY OF CONGENITAL HEART DISEASE: ICD-10-CM

## 2020-02-27 NOTE — TELEPHONE ENCOUNTER
----- Message from Baron Mims sent at 2/27/2020 10:29 AM EST -----  Regarding: Sharon Mcmahan MD  Level 1/Escalated Issue      Caller's first and last name and relationship (if not the patient):      Best contact number(s): [phone]      What are the symptoms: Pain in right and left hand      Transfer successful - yes/no (include outcome): No      Transfer declined - yes/no (include reason): N/A      Was caller advised to seek appropriate level of care - yes/no:      Details to clarify the request:        Baron Mims

## 2020-02-27 NOTE — TELEPHONE ENCOUNTER
Received staff message. Called pt and Envera already scheduled appt for Monday. Pt needed no further assistance.

## 2020-02-28 RX ORDER — AMOXICILLIN 500 MG/1
CAPSULE ORAL
Qty: 4 CAP | Refills: 0 | Status: SHIPPED | OUTPATIENT
Start: 2020-02-28 | End: 2020-09-08

## 2020-03-02 ENCOUNTER — OFFICE VISIT (OUTPATIENT)
Dept: FAMILY MEDICINE CLINIC | Age: 55
End: 2020-03-02

## 2020-03-02 VITALS
WEIGHT: 232 LBS | RESPIRATION RATE: 16 BRPM | SYSTOLIC BLOOD PRESSURE: 139 MMHG | HEIGHT: 67 IN | OXYGEN SATURATION: 99 % | DIASTOLIC BLOOD PRESSURE: 75 MMHG | HEART RATE: 64 BPM | TEMPERATURE: 97.4 F | BODY MASS INDEX: 36.41 KG/M2

## 2020-03-02 DIAGNOSIS — G56.22 ULNAR NEUROPATHY OF LEFT UPPER EXTREMITY: ICD-10-CM

## 2020-03-02 DIAGNOSIS — G56.01 CARPAL TUNNEL SYNDROME OF RIGHT WRIST: Primary | ICD-10-CM

## 2020-03-02 DIAGNOSIS — I10 ESSENTIAL HYPERTENSION: ICD-10-CM

## 2020-03-02 RX ORDER — DICLOFENAC SODIUM 50 MG/1
50 TABLET, DELAYED RELEASE ORAL
Qty: 60 TAB | Refills: 1 | Status: SHIPPED | OUTPATIENT
Start: 2020-03-02 | End: 2021-05-21

## 2020-03-02 NOTE — PROGRESS NOTES
CC: Numbness and tingling    HPI: Pt is a 53 y.o. female who presents for numbness and tingling in the 4th and 5th digits of the left hand. Has been going on for the past 2 weeks. She has a h/o carpal tunnel syndrome in the right hand and has been using a brace which helps but the fingers seem to be staying numb longer. She tried some cold gel that her mother bought and that helped. She has never tried NSAIDs or exercises. She does try to modify her activity on the right side to avoid positions that put pressure on the carpal tunnel. She also reports a cracking sound in her neck when she moves it from side to side, but it does not hurt. Past Medical History:   Diagnosis Date    Heart murmur     Since birth    HTN (hypertension)        History reviewed. No pertinent family history. Social History     Tobacco Use    Smoking status: Current Every Day Smoker    Smokeless tobacco: Never Used   Substance Use Topics    Alcohol use: Yes    Drug use: Yes     Types: Marijuana       ROS:  Positive only when bolded  Constitutional: F/C, changes in weight  Musculoskeletal: Myalgias, arthralgias  Neurological: Changes in gait, numbness/tingling    PE:  Visit Vitals  /75 (BP 1 Location: Left arm, BP Patient Position: Sitting)   Pulse 64   Temp 97.4 °F (36.3 °C) (Oral)   Resp 16   Ht 5' 7\" (1.702 m)   Wt 232 lb (105.2 kg)   SpO2 99%   BMI 36.34 kg/m²     Gen: Pt sitting in chair, in NAD  Head: Normocephalic, atraumatic  Eyes: Sclera anicteric, EOM grossly intact, PERRL  Throat: MMM, normal lips, tongue and gums  Neck: Supple  CVS: Normal S1, S2, no m/r/g  Resp: CTAB, no wheezes or rales  MSK: No swelling or muscle wasting in hands b/l. Normal strength and sensation in hands b/l. Full AROM of neck without pain. Extrem: Atraumatic, no cyanosis or edema  Pulses: 2+   Skin: Warm, dry  Neuro: Alert, oriented, appropriate      A/P:   Encounter Diagnoses     ICD-10-CM ICD-9-CM   1.  Carpal tunnel syndrome of right wrist G56.01 354.0   2. Essential hypertension I10 401.9   3. Ulnar neuropathy of left upper extremity G56.22 354.2     1. Carpal tunnel syndrome of right wrist: Discussed options with pt, will do trial of NSAID in addition to exercises. She will continue to use the splint and modify her activities to avoid exacerbating this. She can use her mother's cold spray. If symptoms not improving, will consider injection or referral to Ortho but she would prefer to hold off on these at this time. - diclofenac EC (VOLTAREN) 50 mg EC tablet; Take 1 Tab by mouth two (2) times daily as needed for Pain. Dispense: 60 Tab; Refill: 1    2. Essential hypertension: Well controlled, will get routine labs. - METABOLIC PANEL, COMPREHENSIVE; Future  - LIPID PANEL; Future    3. Ulnar neuropathy of left upper extremity: Discussed with pt, difficult to tell site of entrapment, but could be her neck given the popping sounds she hears with rotation of the neck. Will do conservative therapy with exercises and NSAIDs and if no improvement will send to Ortho for further evaluation. RTC in 6 months for f/u chronic conditions, or sooner prn    Discussed diagnoses in detail with patient. Medication risks/benefits/side effects discussed with patient. All of the patient's questions were addressed. The patient understands and agrees with our plan of care. The patient knows to call back if they are unsure of or forget any changes we discussed today or if the symptoms change. The patient received an After-Visit Summary which contains VS, orders, medication list and allergy list. This can be used as a \"mini-medical record\" should they have to seek medical care while out of town. Current Outpatient Medications on File Prior to Visit   Medication Sig Dispense Refill    conjugated estrogens (PREMARIN) 0.625 mg/gram vaginal cream Insert 0.5 g into vagina daily. 30 g 3    atenolol (TENORMIN) 25 mg tablet Take 1 Tab by mouth daily.  90 Tab 1    cholecalciferol (VITAMIN D3) 1,000 unit cap Take  by mouth daily.  calcium carbonate-vitamin D3 (CALTRATE 600 + D) 600 mg (1,500 mg)-800 unit chew Take  by mouth.  amoxicillin (AMOXIL) 500 mg capsule TAKE 4 CAPSULES BY MOUTH FOR 1 DOSE (GENERIC AMOXIL) 4 Cap 0     No current facility-administered medications on file prior to visit.

## 2020-03-02 NOTE — PATIENT INSTRUCTIONS
Right Hand: Carpal Tunnel Syndrome: Exercises  Introduction  Here are some examples of exercises for you to try. The exercises may be suggested for a condition or for rehabilitation. Start each exercise slowly. Ease off the exercises if you start to have pain. You will be told when to start these exercises and which ones will work best for you. Warm-up stretches  When you no longer have pain or numbness, you can do exercises to help prevent carpal tunnel syndrome from coming back. Do not do any stretch or movement that is uncomfortable or painful. 1. Rotate your wrist up, down, and from side to side. Repeat 4 times. 2. Stretch your fingers far apart. Relax them, and then stretch them again. Repeat 4 times. 3. Stretch your thumb by pulling it back gently, holding it, and then releasing it. Repeat 4 times. How to do the exercises  Prayer stretch    1. Start with your palms together in front of your chest just below your chin. 2. Slowly lower your hands toward your waistline, keeping your hands close to your stomach and your palms together until you feel a mild to moderate stretch under your forearms. 3. Hold for at least 15 to 30 seconds. Repeat 2 to 4 times. Wrist flexor stretch    1. Extend your arm in front of you with your palm up. 2. Bend your wrist, pointing your hand toward the floor. 3. With your other hand, gently bend your wrist farther until you feel a mild to moderate stretch in your forearm. 4. Hold for at least 15 to 30 seconds. Repeat 2 to 4 times. Wrist extensor stretch    1. Repeat steps 1 through 4 of the stretch above, but begin with your extended hand palm down. Follow-up care is a key part of your treatment and safety. Be sure to make and go to all appointments, and call your doctor if you are having problems. It's also a good idea to know your test results and keep a list of the medicines you take. Where can you learn more?   Go to http://eleanor-amor.info/. Enter P979 in the search box to learn more about \"Carpal Tunnel Syndrome: Exercises. \"  Current as of: June 26, 2019  Content Version: 12.2  © 0781-7598 [employer]. Care instructions adapted under license by [employer] (which disclaims liability or warranty for this information). If you have questions about a medical condition or this instruction, always ask your healthcare professional. [address] any warranty or liability for your use of this information. Left Hand: Ulnar Neuropathy (Handlebar Palsy): Exercises  Introduction  Here are some examples of exercises for you to try. The exercises may be suggested for a condition or for rehabilitation. Start each exercise slowly. Ease off the exercises if you start to have pain. You will be told when to start these exercises and which ones will work best for you. How to do the exercises  Neck rotation    1. Sit in a firm chair, or stand up straight. 2. Keeping your chin level, turn your head to the right, and hold for 15 to 30 seconds. 3. Turn your head to the left, and hold for 15 to 30 seconds. 4. Repeat 2 to 4 times to each side. Shoulder blade squeeze    1. While standing with your arms at your sides, squeeze your shoulder blades together. Do not raise your shoulders as you are squeezing. 2. Hold for 6 seconds. 3. Repeat 8 to 12 times. Neck stretches    1. Look straight ahead, and tip your right ear to your right shoulder. Do not let your left shoulder rise as you tip your head to the right. 2. Hold for 15 to 30 seconds. 3. Tilt your head to the left. Do not let your right shoulder rise as you tip your head to the left. 4. Hold for 15 to 30 seconds. 5. Repeat 2 to 4 times to each side. Elbow flexion and extension    1. Stand with your arms relaxed at your sides.   2. With your affected arm, gently bend your elbow up toward you as far as possible. 3. Then straighten your arm as much as you can. 4. Repeat 2 to 4 times. Wrist flexor stretch    1. Extend your affected arm in front of you with your palm facing away from your body. 2. Bend back your wrist on your affected arm, pointing your hand up toward the ceiling. 3. With your other hand, gently bend your wrist farther until you feel a mild to moderate stretch in your forearm. 4. Hold for at least 15 to 30 seconds. 5. Repeat 2 to 4 times. 6. Repeat steps 1 through 5, but this time extend your affected arm in front of you with your palm facing up. Then bend back your wrist, pointing your hand toward the floor. Wrist flexion and extension    1. Place your forearm on a table, with your affected hand and wrist extended beyond the table, palm down. 2. Slowly bend your wrist to move your hand upward and allow your hand to close into a fist. Hold for about 6 seconds. 3. Then lower your hand and allow your fingers to relax. Hold this position for about 6 seconds. You should feel a gentle stretch. 4. Repeat 8 to 12 times. Follow-up care is a key part of your treatment and safety. Be sure to make and go to all appointments, and call your doctor if you are having problems. It's also a good idea to know your test results and keep a list of the medicines you take. Where can you learn more? Go to http://eleanor-amor.info/. Enter H425 in the search box to learn more about \"Ulnar Neuropathy (Handlebar Palsy): Exercises. \"  Current as of: June 26, 2019  Content Version: 12.2  © 1023-1994 [employer]. Care instructions adapted under license by [employer] (which disclaims liability or warranty for this information). If you have questions about a medical condition or this instruction, always ask your healthcare professional. [address] any warranty or liability for your use of this information.

## 2020-03-02 NOTE — PROGRESS NOTES
1. Have you been to the ER, urgent care clinic since your last visit? Hospitalized since your last visit? no    2. Have you seen or consulted any other health care providers outside of the [address] [place] [address] since your last visit? Include any pap smears or colon screening. No  Reviewed record in preparation for visit and have obtained necessary documentation. Patient did not bring medications to visit for review. Information provided on Advanced Directive, Living Will. Body mass index is 36.34 kg/m².    Health Maintenance Due   Topic Date Due    Pneumococcal 0-64 years (1 of 1 - PPSV23) 04/27/1970    DTaP/Tdap/Td series (1 - Tdap) 04/27/1975    Shingrix Vaccine Age 50> (1 of 2) 04/27/2014

## 2020-05-28 RX ORDER — ATENOLOL 25 MG/1
TABLET ORAL
Qty: 30 TAB | Refills: 0 | Status: SHIPPED | OUTPATIENT
Start: 2020-05-28 | End: 2020-06-29

## 2020-05-28 NOTE — TELEPHONE ENCOUNTER
Cardiologist: Dr. Aroldo Matamoros    Last appt: Visit date not found  No future appointments.     Requested Prescriptions     Signed Prescriptions Disp Refills    atenoloL (TENORMIN) 25 mg tablet 30 Tab 0     Sig: Take 1 tablet by mouth once daily     Authorizing Provider: Michael Carcamo     Ordering User: Suzanne Bradford         Refills VO per Dr. Aroldo Matamoros.

## 2020-06-09 ENCOUNTER — TELEPHONE (OUTPATIENT)
Dept: FAMILY MEDICINE CLINIC | Age: 55
End: 2020-06-09

## 2020-06-09 ENCOUNTER — PATIENT MESSAGE (OUTPATIENT)
Dept: FAMILY MEDICINE CLINIC | Age: 55
End: 2020-06-09

## 2020-06-09 DIAGNOSIS — Z12.11 SCREENING FOR MALIGNANT NEOPLASM OF COLON: Primary | ICD-10-CM

## 2020-06-09 NOTE — TELEPHONE ENCOUNTER
Called to schedule next available appt. Pt states that she will have her daughter call back to schedule.

## 2020-06-09 NOTE — TELEPHONE ENCOUNTER
----- Message from Nida Roy sent at 6/9/2020  9:31 AM EDT -----  Regarding: DR Kevin Lopez / Gilberto Null Message/Vendor Calls    Pt is requesting an appt to get blood work this week.        Callback required   Best contact number(s): [phone]          Nida Roy

## 2020-06-10 ENCOUNTER — HOSPITAL ENCOUNTER (OUTPATIENT)
Dept: LAB | Age: 55
Discharge: HOME | End: 2020-06-10

## 2020-06-10 DIAGNOSIS — I10 ESSENTIAL HYPERTENSION: ICD-10-CM

## 2020-06-10 LAB
ALBUMIN SERPL-MCNC: 3.3 G/DL (ref 3.5–5)
ALBUMIN/GLOB SERPL: 1 {RATIO} (ref 1.1–2.2)
ALP SERPL-CCNC: 57 U/L (ref 45–117)
ALT SERPL-CCNC: 19 U/L (ref 12–78)
ANION GAP SERPL CALC-SCNC: 6 MMOL/L (ref 5–15)
AST SERPL-CCNC: 14 U/L (ref 15–37)
BILIRUB SERPL-MCNC: 0.3 MG/DL (ref 0.2–1)
BUN SERPL-MCNC: 11 MG/DL (ref 6–20)
BUN/CREAT SERPL: 15 (ref 12–20)
CALCIUM SERPL-MCNC: 9 MG/DL (ref 8.5–10.1)
CHLORIDE SERPL-SCNC: 108 MMOL/L (ref 97–108)
CHOLEST SERPL-MCNC: 164 MG/DL
CO2 SERPL-SCNC: 26 MMOL/L (ref 21–32)
CREAT SERPL-MCNC: 0.75 MG/DL (ref 0.55–1.02)
GLOBULIN SER CALC-MCNC: 3.2 G/DL (ref 2–4)
GLUCOSE SERPL-MCNC: 92 MG/DL (ref 65–100)
HDLC SERPL-MCNC: 49 MG/DL
HDLC SERPL: 3.3 {RATIO} (ref 0–5)
LDLC SERPL CALC-MCNC: 105.2 MG/DL (ref 0–100)
LIPID PROFILE,FLP: ABNORMAL
POTASSIUM SERPL-SCNC: 4.2 MMOL/L (ref 3.5–5.1)
PROT SERPL-MCNC: 6.5 G/DL (ref 6.4–8.2)
SODIUM SERPL-SCNC: 140 MMOL/L (ref 136–145)
TRIGL SERPL-MCNC: 49 MG/DL (ref ?–150)
VLDLC SERPL CALC-MCNC: 9.8 MG/DL

## 2020-06-10 NOTE — TELEPHONE ENCOUNTER
From: Saeed Grossman  To: Cherelle rGeen MD  Sent: 6/9/2020 6:08 PM EDT  Subject: Non-Urgent Medical Question    I will be in your office to do my blood work on June 10th. .. do you I can  the home test to do my stool test to mailEd into the lab.

## 2020-06-15 DIAGNOSIS — Z12.31 ENCOUNTER FOR SCREENING MAMMOGRAM FOR BREAST CANCER: Primary | ICD-10-CM

## 2020-06-15 DIAGNOSIS — E78.2 MIXED HYPERLIPIDEMIA: ICD-10-CM

## 2020-06-19 LAB — HEMOCCULT STL QL IA: NEGATIVE

## 2020-06-29 RX ORDER — ATENOLOL 25 MG/1
TABLET ORAL
Qty: 30 TAB | Refills: 0 | Status: SHIPPED | OUTPATIENT
Start: 2020-06-29 | End: 2020-08-06

## 2020-06-29 NOTE — TELEPHONE ENCOUNTER
Cardiologist: Dr. Dave Mñuiz    Last appt: Visit date not found  No future appointments.     Requested Prescriptions     Signed Prescriptions Disp Refills    atenoloL (TENORMIN) 25 mg tablet 30 Tab 0     Sig: Take 1 tablet by mouth once daily     Authorizing Provider: Rosalba Miller     Ordering User: Tanya Garza         Refills VO per Dr. Dave Muñiz.

## 2020-07-06 ENCOUNTER — TELEPHONE (OUTPATIENT)
Dept: FAMILY MEDICINE CLINIC | Age: 55
End: 2020-07-06

## 2020-07-06 NOTE — TELEPHONE ENCOUNTER
----- Message from Christine Lawrence sent at 7/3/2020  2:49 PM EDT -----  Regarding: /Telephone  General Message/Vendor Calls    Caller's first and last name:      Reason for call:  Would like to know if fax was received. Callback required yes/no and why:  Yes, to let her know if the FMLA forms were received.     Best contact number(s):  [phone]    Details to clarify the request:      Christine Lawrence

## 2020-09-08 DIAGNOSIS — Z87.74 ADULT PATIENT WITH HISTORY OF CONGENITAL HEART DISEASE: ICD-10-CM

## 2020-09-08 RX ORDER — AMOXICILLIN 500 MG/1
CAPSULE ORAL
Qty: 4 CAP | Refills: 0 | Status: SHIPPED | OUTPATIENT
Start: 2020-09-08 | End: 2021-05-24

## 2020-10-01 ENCOUNTER — VIRTUAL VISIT (OUTPATIENT)
Dept: FAMILY MEDICINE CLINIC | Age: 55
End: 2020-10-01
Payer: MEDICAID

## 2020-10-01 DIAGNOSIS — K59.00 CONSTIPATION, UNSPECIFIED CONSTIPATION TYPE: ICD-10-CM

## 2020-10-01 DIAGNOSIS — R45.7 CAREGIVER STRESS SYNDROME: Primary | ICD-10-CM

## 2020-10-01 PROCEDURE — 99442 PR PHYS/QHP TELEPHONE EVALUATION 11-20 MIN: CPT | Performed by: FAMILY MEDICINE

## 2020-10-01 RX ORDER — POLYETHYLENE GLYCOL 3350 17 G/17G
17 POWDER, FOR SOLUTION ORAL DAILY
Qty: 289 G | Refills: 3 | Status: SHIPPED | OUTPATIENT
Start: 2020-10-01
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
## 2021-05-21 ENCOUNTER — OFFICE VISIT (OUTPATIENT)
Dept: FAMILY MEDICINE CLINIC | Age: 56
End: 2021-05-21
Payer: MEDICAID

## 2021-05-21 VITALS
OXYGEN SATURATION: 99 % | RESPIRATION RATE: 16 BRPM | TEMPERATURE: 98 F | WEIGHT: 221 LBS | HEART RATE: 56 BPM | SYSTOLIC BLOOD PRESSURE: 138 MMHG | HEIGHT: 67 IN | BODY MASS INDEX: 34.69 KG/M2 | DIASTOLIC BLOOD PRESSURE: 83 MMHG

## 2021-05-21 DIAGNOSIS — M54.50 ACUTE LEFT-SIDED LOW BACK PAIN WITHOUT SCIATICA: Primary | ICD-10-CM

## 2021-05-21 PROCEDURE — 99213 OFFICE O/P EST LOW 20 MIN: CPT | Performed by: STUDENT IN AN ORGANIZED HEALTH CARE EDUCATION/TRAINING PROGRAM

## 2021-05-21 RX ORDER — DICLOFENAC SODIUM 75 MG/1
75 TABLET, DELAYED RELEASE ORAL
Qty: 60 TABLET | Refills: 0 | Status: SHIPPED | OUTPATIENT
Start: 2021-05-21 | End: 2021-07-27

## 2021-05-21 RX ORDER — CYCLOBENZAPRINE HCL 10 MG
10 TABLET ORAL
Qty: 30 TABLET | Refills: 0 | Status: SHIPPED | OUTPATIENT
Start: 2021-05-21 | End: 2021-08-27 | Stop reason: ALTCHOICE

## 2021-05-21 NOTE — PROGRESS NOTES
1. Have you been to the ER, urgent care clinic since your last visit? Hospitalized since your last visit? No    2. Have you seen or consulted any other health care providers outside of the [address] [place] [address] since your last visit? Include any pap smears or colon screening. No    Reviewed record in preparation for visit and have necessary documentation  Goals that were addressed and/or need to be completed during or after this appointment include     Health Maintenance Due   Topic Date Due    Pneumococcal 0-64 years (1 of 2 - PPSV23) Never done    COVID-19 Vaccine (1) Never done    DTaP/Tdap/Td series (1 - Tdap) Never done    Shingrix Vaccine Age 50> (1 of 2) Never done    Colorectal Cancer Screening Combo  06/15/2021       Patient is accompanied by self I have received verbal consent from Luca Carney to discuss any/all medical information while they are present in the room.

## 2021-05-21 NOTE — PATIENT INSTRUCTIONS
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
## 2021-05-25 RX ORDER — ATENOLOL 25 MG/1
TABLET ORAL
Qty: 30 TABLET | Refills: 3 | Status: SHIPPED | OUTPATIENT
Start: 2021-05-25 | End: 2021-09-21

## 2021-06-07 ENCOUNTER — VIRTUAL VISIT (OUTPATIENT)
Dept: FAMILY MEDICINE CLINIC | Age: 56
End: 2021-06-07
Payer: MEDICAID

## 2021-06-07 DIAGNOSIS — Z12.11 SCREENING FOR MALIGNANT NEOPLASM OF COLON: ICD-10-CM

## 2021-06-07 DIAGNOSIS — Z12.31 ENCOUNTER FOR SCREENING MAMMOGRAM FOR MALIGNANT NEOPLASM OF BREAST: ICD-10-CM

## 2021-06-07 DIAGNOSIS — M54.50 ACUTE LEFT-SIDED LOW BACK PAIN WITHOUT SCIATICA: Primary | ICD-10-CM

## 2021-06-07 DIAGNOSIS — I10 ESSENTIAL HYPERTENSION: ICD-10-CM

## 2021-06-07 PROCEDURE — 99214 OFFICE O/P EST MOD 30 MIN: CPT | Performed by: FAMILY MEDICINE

## 2021-06-07 NOTE — PROGRESS NOTES
Brett Wynne is a 61 y.o. female who was seen by synchronous (real-time) audio-video technology. Consent:  Patient and/or their healthcare decision maker is aware that this patient-initiated Telehealth encounter is a billable service, with coverage as determined by their insurance carrier. They are aware that they may receive a bill and have provided verbal consent to proceed: Yes    I was in the office while conducting this encounter. Platform: Doxy. me    HPI: Pt is a 61 y.o. female who presents for f/u HTN and low back pain. She was seen for low back pain several weeks ago and was treated with flexeril and diclofenac. Unfortunately this has not helped and she has not been able to take the flexeril very much because it makes her too sleepy. Pain is in the left lower back and does not radiate. She has also been doing some exercises at home. Per chart review the plan was to obtain imaging if pain did not improve with conservative measures. HTN:  Checking BPs at home?: NO  Headaches?: NO  Blurry vision?: NO  Lower extremity edema?: NO  Smoking?: YES        Past Medical History:   Diagnosis Date    Heart murmur     Since birth    HTN (hypertension)        No family history on file. Social History     Tobacco Use    Smoking status: Current Every Day Smoker    Smokeless tobacco: Never Used   Substance Use Topics    Alcohol use: Yes    Drug use: Yes     Types: Marijuana       ROS:  Per HPI    PE:  There were no vitals taken for this visit. Gen: Pt in NAD  Head: Normocephalic, atraumatic  Eyes: Sclera anicteric, EOM grossly intact  Throat: MMM  Neck: Supple  Resp: Speaking easily in full sentences without respiratory distress  Neuro: Alert, oriented, appropriate      A/P:   Encounter Diagnoses     ICD-10-CM ICD-9-CM   1. Acute left-sided low back pain without sciatica  M54.5 724.2   2. Encounter for screening mammogram for malignant neoplasm of breast  Z12.31 V76.12   3.  Screening for malignant neoplasm of colon  Z12.11 V76.51   4. Essential hypertension  I10 401.9     1. Acute left-sided low back pain without sciatica: Per history seems most likely muscular in nature, however has not responded as expected to therapy. Advised she could cut the flexeril in half and take 5mg TID prn which would likely cause less sedation than the 10mg dose. Will also get XR and put in referral to PT as she has to leave for [facility] in a few weeks. - REFERRAL TO PHYSICAL THERAPY  - XR SPINE LUMB 2 OR 3 V; Future    2. Encounter for screening mammogram for malignant neoplasm of breast  - [facility] 3D CHIARA W MAMMO BI SCREENING INCL CAD; Future    3. Screening for malignant neoplasm of colon  - OCCULT BLOOD IMMUNOASSAY,DIAGNOSTIC; Future  - OCCULT BLOOD IMMUNOASSAY,DIAGNOSTIC    4. Essential hypertension: Stable on last check in office a few weeks ago  - LIPID PANEL; Future  - METABOLIC PANEL, COMPREHENSIVE; Future       RTC in 6 months for f/u chronic conditions, or sooner prn    Discussed diagnoses in detail with patient. Medication risks/benefits/side effects discussed with patient. All of the patient's questions were addressed. The patient understands and agrees with our plan of care. The patient knows to call back if they are unsure of or forget any changes we discussed today or if the symptoms change. Luca Carney is a 61 y.o. female being evaluated by a video visit encounter for concerns as above. A caregiver was present when appropriate. Due to this being a TeleHealth encounter (During [facility]- public health emergency), evaluation of the following organ systems was limited: Vitals/Constitutional/EENT/Resp/CV/GI//MS/Neuro/Skin/Heme-Lymph-Imm.   Pursuant to the emergency declaration under the [facility]5 waiver authority and the [employer] Act, this Virtual  Visit was conducted, with patient's (and/or legal guardian's) consent, to reduce the patient's risk of exposure to COVID-19 and provide necessary medical care. Services were provided through a video synchronous discussion virtually to substitute for in-person clinic visit. Patient and provider were located in their home and in the office, respectively. Current Outpatient Medications on File Prior to Visit   Medication Sig Dispense Refill    atenoloL (TENORMIN) 25 mg tablet Take 1 tablet by mouth once daily 30 Tablet 3    diclofenac EC (VOLTAREN) 75 mg EC tablet Take 1 Tablet by mouth two (2) times daily as needed for Pain. 60 Tablet 0    cyclobenzaprine (FLEXERIL) 10 mg tablet Take 1 Tablet by mouth three (3) times daily as needed for Muscle Spasm(s). 30 Tablet 0    Premarin 0.625 mg/gram vaginal cream INSERT 0.5 GRAMS VAGINALLY DAILY. 30 g 5    polyethylene glycol (MIRALAX) 17 gram/dose powder Take 17 g by mouth daily. 289 g 3    cholecalciferol (VITAMIN D3) 1,000 unit cap Take  by mouth daily.  calcium carbonate-vitamin D3 (CALTRATE 600 + D) 600 mg (1,500 mg)-800 unit chew Take  by mouth. No current facility-administered medications on file prior to visit.

## 2021-06-15 DIAGNOSIS — E78.2 MIXED HYPERLIPIDEMIA: Primary | ICD-10-CM

## 2021-06-15 RX ORDER — ATORVASTATIN CALCIUM 40 MG/1
40 TABLET, FILM COATED ORAL DAILY
Qty: 30 TABLET | Refills: 2 | Status: SHIPPED | OUTPATIENT
Start: 2021-06-15 | End: 2021-09-27

## 2021-07-26 DIAGNOSIS — M54.50 ACUTE LEFT-SIDED LOW BACK PAIN WITHOUT SCIATICA: ICD-10-CM

## 2021-07-27 RX ORDER — DICLOFENAC SODIUM 75 MG/1
TABLET, DELAYED RELEASE ORAL
Qty: 60 TABLET | Refills: 0 | Status: SHIPPED | OUTPATIENT
Start: 2021-07-27 | End: 2021-10-20 | Stop reason: ALTCHOICE

## 2021-08-18 ENCOUNTER — HOSPITAL ENCOUNTER (OUTPATIENT)
Dept: MAMMOGRAPHY | Age: 56
Discharge: HOME | End: 2021-08-18
Attending: FAMILY MEDICINE
Payer: MEDICAID

## 2021-08-18 DIAGNOSIS — Z12.31 ENCOUNTER FOR SCREENING MAMMOGRAM FOR MALIGNANT NEOPLASM OF BREAST: ICD-10-CM

## 2021-08-18 PROCEDURE — 77063 BREAST TOMOSYNTHESIS BI: CPT

## 2021-08-26 ENCOUNTER — OFFICE VISIT (OUTPATIENT)
Dept: FAMILY MEDICINE CLINIC | Age: 56
End: 2021-08-26
Payer: MEDICAID

## 2021-08-26 VITALS
WEIGHT: 215 LBS | HEIGHT: 67 IN | DIASTOLIC BLOOD PRESSURE: 81 MMHG | TEMPERATURE: 98.4 F | SYSTOLIC BLOOD PRESSURE: 148 MMHG | HEART RATE: 55 BPM | BODY MASS INDEX: 33.74 KG/M2 | OXYGEN SATURATION: 100 % | RESPIRATION RATE: 16 BRPM

## 2021-08-26 DIAGNOSIS — K59.00 CONSTIPATION, UNSPECIFIED CONSTIPATION TYPE: ICD-10-CM

## 2021-08-26 DIAGNOSIS — Z23 ENCOUNTER FOR IMMUNIZATION: ICD-10-CM

## 2021-08-26 DIAGNOSIS — J30.1 SEASONAL ALLERGIC RHINITIS DUE TO POLLEN: Primary | ICD-10-CM

## 2021-08-26 DIAGNOSIS — N89.8 VAGINAL IRRITATION: ICD-10-CM

## 2021-08-26 DIAGNOSIS — Z11.3 SCREEN FOR STD (SEXUALLY TRANSMITTED DISEASE): ICD-10-CM

## 2021-08-26 LAB — HEMOCCULT STL QL IA: NEGATIVE

## 2021-08-26 PROCEDURE — 99214 OFFICE O/P EST MOD 30 MIN: CPT | Performed by: FAMILY MEDICINE

## 2021-08-26 RX ORDER — CETIRIZINE HYDROCHLORIDE 10 MG/1
CAPSULE, LIQUID FILLED ORAL
COMMUNITY
Start: 2020-09-14 | End: 2021-08-26 | Stop reason: ALTCHOICE

## 2021-08-26 RX ORDER — ZOSTER VACCINE RECOMBINANT, ADJUVANTED 50 MCG/0.5
0.5 KIT INTRAMUSCULAR ONCE
Qty: 0.5 ML | Refills: 0 | Status: SHIPPED | OUTPATIENT
Start: 2021-08-26 | End: 2021-08-26

## 2021-08-26 RX ORDER — ZOSTER VACCINE RECOMBINANT, ADJUVANTED 50 MCG/0.5
0.5 KIT INTRAMUSCULAR ONCE
Qty: 0.5 ML | Refills: 0 | Status: SHIPPED | OUTPATIENT
Start: 2021-08-26 | End: 2021-08-26 | Stop reason: SDUPTHER

## 2021-08-26 RX ORDER — MONTELUKAST SODIUM 10 MG/1
10 TABLET ORAL DAILY
Qty: 30 TABLET | Refills: 5 | Status: SHIPPED | OUTPATIENT
Start: 2021-08-26 | End: 2022-02-07

## 2021-08-26 RX ORDER — LEVOCETIRIZINE DIHYDROCHLORIDE 5 MG/1
5 TABLET, FILM COATED ORAL DAILY
Qty: 30 TABLET | Refills: 5 | Status: SHIPPED | OUTPATIENT
Start: 2021-08-26 | End: 2022-02-07

## 2021-08-26 NOTE — PATIENT INSTRUCTIONS
DASH Diet: Care Instructions  Your Care Instructions     The DASH diet is an eating plan that can help lower your blood pressure. DASH stands for Dietary Approaches to Stop Hypertension. Hypertension is high blood pressure. The DASH diet focuses on eating foods that are high in calcium, potassium, and magnesium. These nutrients can lower blood pressure. The foods that are highest in these nutrients are fruits, vegetables, low-fat dairy products, nuts, seeds, and legumes. But taking calcium, potassium, and magnesium supplements instead of eating foods that are high in those nutrients does not have the same effect. The DASH diet also includes whole grains, fish, and poultry. The DASH diet is one of several lifestyle changes your doctor may recommend to lower your high blood pressure. Your doctor may also want you to decrease the amount of sodium in your diet. Lowering sodium while following the DASH diet can lower blood pressure even further than just the DASH diet alone. Follow-up care is a key part of your treatment and safety. Be sure to make and go to all appointments, and call your doctor if you are having problems. It's also a good idea to know your test results and keep a list of the medicines you take. How can you care for yourself at home? Following the DASH diet  · Eat 4 to 5 servings of fruit each day. A serving is 1 medium-sized piece of fruit, ½ cup chopped or canned fruit, 1/4 cup dried fruit, or 4 ounces (½ cup) of fruit juice. Choose fruit more often than fruit juice. · Eat 4 to 5 servings of vegetables each day. A serving is 1 cup of lettuce or raw leafy vegetables, ½ cup of chopped or cooked vegetables, or 4 ounces (½ cup) of vegetable juice. Choose vegetables more often than vegetable juice. · Get 2 to 3 servings of low-fat and fat-free dairy each day. A serving is 8 ounces of milk, 1 cup of yogurt, or 1 ½ ounces of cheese. · Eat 6 to 8 servings of grains each day.  A serving is 1 slice of bread, 1 ounce of dry cereal, or ½ cup of cooked rice, pasta, or cooked cereal. Try to choose whole-grain products as much as possible. · Limit lean meat, poultry, and fish to 2 servings each day. A serving is 3 ounces, about the size of a deck of cards. · Eat 4 to 5 servings of nuts, seeds, and legumes (cooked dried beans, lentils, and split peas) each week. A serving is 1/3 cup of nuts, 2 tablespoons of seeds, or ½ cup of cooked beans or peas. · Limit fats and oils to 2 to 3 servings each day. A serving is 1 teaspoon of vegetable oil or 2 tablespoons of salad dressing. · Limit sweets and added sugars to 5 servings or less a week. A serving is 1 tablespoon jelly or jam, ½ cup sorbet, or 1 cup of lemonade. · Eat less than 2,300 milligrams (mg) of sodium a day. If you limit your sodium to 1,500 mg a day, you can lower your blood pressure even more. · Be aware that all of these are the suggested number of servings for people who eat 1,800 to 2,000 calories a day. Your recommended number of servings may be different if you need more or fewer calories. Tips for success  · Start small. Do not try to make dramatic changes to your diet all at once. You might feel that you are missing out on your favorite foods and then be more likely to not follow the plan. Make small changes, and stick with them. Once those changes become habit, add a few more changes. · Try some of the following:  ? Make it a goal to eat a fruit or vegetable at every meal and at snacks. This will make it easy to get the recommended amount of fruits and vegetables each day. ? Try yogurt topped with fruit and nuts for a snack or healthy dessert. ? Add lettuce, tomato, cucumber, and onion to sandwiches. ? Combine a ready-made pizza crust with low-fat mozzarella cheese and lots of vegetable toppings. Try using tomatoes, squash, spinach, broccoli, carrots, cauliflower, and onions. ?  Have a variety of cut-up vegetables with a low-fat dip as an appetizer instead of chips and dip. ? Sprinkle sunflower seeds or chopped almonds over salads. Or try adding chopped walnuts or almonds to cooked vegetables. ? Try some vegetarian meals using beans and peas. Add garbanzo or kidney beans to salads. Make burritos and tacos with mashed thomas beans or black beans. Where can you learn more? Go to http://www.alvarez.com/  Enter H967 in the search box to learn more about \"DASH Diet: Care Instructions. \"  Current as of: August 31, 2020               Content Version: 12.8  © 3262-4366 [employer]. Care instructions adapted under license by [employer] (which disclaims liability or warranty for this information). If you have questions about a medical condition or this instruction, always ask your healthcare professional. [address] any warranty or liability for your use of this information.

## 2021-08-26 NOTE — PROGRESS NOTES
1. Have you been to the ER, urgent care clinic since your last visit? Hospitalized since your last visit? No    2. Have you seen or consulted any other health care providers outside of the [address] [place] [address] since your last visit? Include any pap smears or colon screening.  No  Reviewed record in preparation for visit and have necessary documentation  Pt did not bring medication to office visit for review    Goals that were addressed and/or need to be completed during or after this appointment include   Health Maintenance Due   Topic Date Due    Pneumococcal 0-64 years (1 of 2 - PPSV23) Never done    COVID-19 Vaccine (1) Never done    DTaP/Tdap/Td series (1 - Tdap) Never done    Shingrix Vaccine Age 50> (1 of 2) Never done    Colorectal Cancer Screening Combo  06/15/2021

## 2021-08-26 NOTE — PROGRESS NOTES
CC: Vaginal irritation    HPI: Pt is a 61 y.o. female who presents for vaginal irritation, f/u back pain and stomach pain. Vaginal irritation has been going on for the past 1.5-2 weeks and almost resolved. She denies vaginal discharge or odor. Started shortly after having sex but denies dyspareunia. No lesions in the area. She uses estradiol cream for postmenopausal vaginal dryness and has put some of that on it which seemed to help. She reports that PT has helped with her low back pain. She still has some slight pain but feels like the exercises are really helping. She is having issues with her stomach again. She is going days without a BM and when she has a BM it is hard and pebbly. No straining or pain with BM's. Associated with cramping pain in her stomach. She has tried TUMS and also adjusting her eating schedule to be more regular. She has Miralax at home but has not been using it because she didn't feel like she needed it. She has been having worse allergy symptoms lately. Mainly rhinorrhea. She is taking Zyrtec but it isn't helping as well as it used to. Past Medical History:   Diagnosis Date    Heart murmur     Since birth    HTN (hypertension)        No family history on file. Social History     Tobacco Use    Smoking status: Current Every Day Smoker    Smokeless tobacco: Never Used   Substance Use Topics    Alcohol use:  Yes    Drug use: Yes     Types: Marijuana       ROS:  Per HPI    PE:  Visit Vitals  BP (!) 148/81   Pulse (!) 55   Temp 98.4 °F (36.9 °C)   Resp 16   Ht 5' 7\" (1.702 m)   Wt 215 lb (97.5 kg)   SpO2 100%   BMI 33.67 kg/m²     Gen: Pt sitting in chair, in NAD  Head: Normocephalic, atraumatic  Eyes: Sclera anicteric, EOM grossly intact, PERRL  Ears: TM's pearly with good light reflex b/l  Nose: Normal nasal mucosa, +clear rhinorrhea  Throat: MMM, normal lips, tongue, teeth and gums  Neck: Supple, no LAD, no thyromegaly or carotid bruits  CVS: Normal S1, S2, no m/r/g  Resp: CTAB, no wheezes or rales  Abd: Soft, non-distended, +normoactive BS. Mild TTP just right of the umbilicus without rebound or guarding  Extrem: Atraumatic, no cyanosis or edema  Pulses: 2+   Skin: Warm, dry  Neuro: Alert, oriented, appropriate      A/P:   Encounter Diagnoses     ICD-10-CM ICD-9-CM   1. Seasonal allergic rhinitis due to pollen  J30.1 477.0   2. Encounter for immunization  Z23 V03.89   3. Vaginal irritation  N89.8 623.9   4. Constipation, unspecified constipation type  K59.00 564.00     1. Seasonal allergic rhinitis due to pollen:   - STOP Zyrtec. - START levocetirizine (XYZAL) 5 mg tablet; Take 1 Tablet by mouth daily. Dispense: 30 Tablet; Refill: 5  - START montelukast (SINGULAIR) 10 mg tablet; Take 1 Tablet by mouth daily. Dispense: 30 Tablet; Refill: 5    2. Encounter for immunization  - varicella-zoster recombinant, PF, (Shingrix, PF,) 50 mcg/0.5 mL susr injection; 0.5 mL by IntraMUSCular route once for 1 dose. Dispense: 0.5 mL; Refill: 0    3. Vaginal irritation  - NUSWAB VAGINITIS PLUS; Future  - NUSWAB VAGINITIS PLUS    4. Constipation, unspecified constipation type: Likely cause of abdominal pain. Advised to start Miralax daily and let us know in a week or so if this is not helping. FIT just came back negative today. RTC in 6 months for f/u chronic conditions, or sooner prn    The patient was counseled on the dangers of tobacco use, and was advised to quit. Reviewed strategies to maximize success, including support of family/friends. Discussed diagnoses in detail with patient. Medication risks/benefits/side effects discussed with patient. All of the patient's questions were addressed. The patient understands and agrees with our plan of care. The patient knows to call back if they are unsure of or forget any changes we discussed today or if the symptoms change.   The patient received an After-Visit Summary which contains VS, orders, medication list and allergy list. This can be used as a \"mini-medical record\" should they have to seek medical care while out of town. Current Outpatient Medications on File Prior to Visit   Medication Sig Dispense Refill    diclofenac EC (VOLTAREN) 75 mg EC tablet Take 1 tablet by mouth twice daily as needed for pain 60 Tablet 0    atorvastatin (LIPITOR) 40 mg tablet Take 1 Tablet by mouth daily. 30 Tablet 2    atenoloL (TENORMIN) 25 mg tablet Take 1 tablet by mouth once daily 30 Tablet 3    Premarin 0.625 mg/gram vaginal cream INSERT 0.5 GRAMS VAGINALLY DAILY. 30 g 5    polyethylene glycol (MIRALAX) 17 gram/dose powder Take 17 g by mouth daily. 289 g 3    cholecalciferol (VITAMIN D3) 1,000 unit cap Take  by mouth daily.  calcium carbonate-vitamin D3 (CALTRATE 600 + D) 600 mg (1,500 mg)-800 unit chew Take  by mouth.  multivit-min/iron/folic/lutein (CENTRUM SILVER WOMEN PO)       cyclobenzaprine (FLEXERIL) 10 mg tablet Take 1 Tablet by mouth three (3) times daily as needed for Muscle Spasm(s). (Patient not taking: Reported on 8/26/2021) 30 Tablet 0     No current facility-administered medications on file prior to visit.

## 2021-08-29 LAB
A VAGINAE DNA VAG QL NAA+PROBE: ABNORMAL SCORE
BVAB2 DNA VAG QL NAA+PROBE: ABNORMAL SCORE
C ALBICANS DNA VAG QL NAA+PROBE: POSITIVE
C GLABRATA DNA VAG QL NAA+PROBE: NEGATIVE
C TRACH RRNA SPEC QL NAA+PROBE: NEGATIVE
MEGA1 DNA VAG QL NAA+PROBE: ABNORMAL SCORE
N GONORRHOEA RRNA SPEC QL NAA+PROBE: NEGATIVE
SPECIMEN SOURCE: ABNORMAL
T VAGINALIS RRNA SPEC QL NAA+PROBE: POSITIVE

## 2021-08-31 ENCOUNTER — TELEPHONE (OUTPATIENT)
Dept: FAMILY MEDICINE CLINIC | Age: 56
End: 2021-08-31

## 2021-08-31 DIAGNOSIS — A59.01 TRICHOMONAS VAGINITIS: ICD-10-CM

## 2021-08-31 DIAGNOSIS — B37.31 VAGINAL CANDIDIASIS: Primary | ICD-10-CM

## 2021-08-31 DIAGNOSIS — B96.89 BACTERIAL VAGINOSIS: ICD-10-CM

## 2021-08-31 DIAGNOSIS — N76.0 BACTERIAL VAGINOSIS: ICD-10-CM

## 2021-08-31 RX ORDER — FLUCONAZOLE 150 MG/1
150 TABLET ORAL DAILY
Qty: 1 TABLET | Refills: 0 | Status: SHIPPED | OUTPATIENT
Start: 2021-08-31 | End: 2021-09-01

## 2021-08-31 RX ORDER — METRONIDAZOLE 500 MG/1
500 TABLET ORAL 2 TIMES DAILY
Qty: 14 TABLET | Refills: 0 | Status: SHIPPED | OUTPATIENT
Start: 2021-08-31 | End: 2021-09-07

## 2021-08-31 NOTE — TELEPHONE ENCOUNTER
Called pt to advise of recent lab results. Left message to call back and will send MyChart message. Nuswab positive for yeast, bacterial vaginosis and trichomonas. Will send rx for diflucan and Flagyl to her pharmacy. She should not drink alcohol while taking the Flagyl as it could make her sick to her stomach. Her partner should also be tested/treated for the the trichomonas.

## 2021-09-02 ENCOUNTER — TELEPHONE (OUTPATIENT)
Dept: FAMILY MEDICINE CLINIC | Age: 56
End: 2021-09-02

## 2021-09-02 NOTE — TELEPHONE ENCOUNTER
Pt states she just came in and saw Dr Diogo Chu was wondering if she would be willing to add a HIV test for her.  Leave a message on voice mail

## 2021-09-03 DIAGNOSIS — Z11.3 SCREEN FOR STD (SEXUALLY TRANSMITTED DISEASE): Primary | ICD-10-CM

## 2021-09-04 LAB
HIV 1+2 AB+HIV1 P24 AG SERPL QL IA: NONREACTIVE
HIV12 RESULT COMMENT, HHIVC: NORMAL
RPR SER QL: NONREACTIVE

## 2021-10-20 DIAGNOSIS — M54.50 CHRONIC BILATERAL LOW BACK PAIN WITHOUT SCIATICA: Primary | ICD-10-CM

## 2021-10-20 DIAGNOSIS — G89.29 CHRONIC BILATERAL LOW BACK PAIN WITHOUT SCIATICA: Primary | ICD-10-CM

## 2021-10-20 RX ORDER — MELOXICAM 15 MG/1
15 TABLET ORAL
Qty: 30 TABLET | Refills: 3 | Status: SHIPPED | OUTPATIENT
Start: 2021-10-20 | End: 2022-08-05

## 2021-10-26 ENCOUNTER — OFFICE VISIT (OUTPATIENT)
Dept: FAMILY MEDICINE CLINIC | Age: 56
End: 2021-10-26
Payer: MEDICAID

## 2021-10-26 VITALS
WEIGHT: 218 LBS | HEIGHT: 67 IN | TEMPERATURE: 98.3 F | OXYGEN SATURATION: 98 % | BODY MASS INDEX: 34.21 KG/M2 | SYSTOLIC BLOOD PRESSURE: 139 MMHG | HEART RATE: 60 BPM | RESPIRATION RATE: 20 BRPM | DIASTOLIC BLOOD PRESSURE: 78 MMHG

## 2021-10-26 DIAGNOSIS — N89.8 VAGINAL ITCHING: Primary | ICD-10-CM

## 2021-10-26 PROCEDURE — 99213 OFFICE O/P EST LOW 20 MIN: CPT | Performed by: STUDENT IN AN ORGANIZED HEALTH CARE EDUCATION/TRAINING PROGRAM

## 2021-10-26 NOTE — PROGRESS NOTES
1. Have you been to the ER, urgent care clinic since your last visit? Hospitalized since your last visit? No    2. Have you seen or consulted any other health care providers outside of the [address] [place] [address] since your last visit? Include any pap smears or colon screening. No    Reviewed record in preparation for visit and have necessary documentation  Goals that were addressed and/or need to be completed during or after this appointment include     Health Maintenance Due   Topic Date Due    Pneumococcal 0-64 years (1 of 2 - PPSV23) Never done    COVID-19 Vaccine (1) Never done    DTaP/Tdap/Td series (1 - Tdap) Never done    Shingrix Vaccine Age 50> (1 of 2) Never done    Flu Vaccine (1) 09/01/2021       Patient is accompanied by self I have received verbal consent from Yolie Draper to discuss any/all medical information while they are present in the room. Statement Selected

## 2021-10-26 NOTE — PROGRESS NOTES
[facility] [address] [place] [address], [facility] 24  P ([phone])  Date of visit:  10/26/2021    Josh Rivas is a 61 y.o. female who presents to the clinic today due to vaginal irritation. According to pt, vaginal itching started last week. She used vinegar and water to rinse the vaginal area. Her partner also noticed a raised bump in the vaginal area. It was swollen and irritated, the swelling has improved. She denied vaginal discharge,  no odor. Only sexually active with one partner. No hx of herpes. Denies abdominal pain, n/v, fever. Pt tested positive on 08/26/2021 for yeast, bacterial vaginosis and trichomonas. She finished treatment and partner was also treated. Review of Systems   Per HPI    Allergies   Allergies   Allergen Reactions    Amlodipine Other (comments)     Chest pressure, nausea, headache.  Lisinopril Nausea Only       Medications  Current Outpatient Medications   Medication Sig    meloxicam (MOBIC) 15 mg tablet Take 1 Tablet by mouth daily as needed for Pain.  atorvastatin (LIPITOR) 40 mg tablet Take 1 tablet by mouth once daily    atenoloL (TENORMIN) 25 mg tablet Take 1 tablet by mouth once daily    multivit-min/iron/folic/lutein (CENTRUM SILVER WOMEN PO)     levocetirizine (XYZAL) 5 mg tablet Take 1 Tablet by mouth daily.  montelukast (SINGULAIR) 10 mg tablet Take 1 Tablet by mouth daily.  Premarin 0.625 mg/gram vaginal cream INSERT 0.5 GRAMS VAGINALLY DAILY.  polyethylene glycol (MIRALAX) 17 gram/dose powder Take 17 g by mouth daily.  cholecalciferol (VITAMIN D3) 1,000 unit cap Take  by mouth daily.  calcium carbonate-vitamin D3 (CALTRATE 600 + D) 600 mg (1,500 mg)-800 unit chew Take  by mouth. No current facility-administered medications for this visit.        Medical History  Past Medical History:   Diagnosis Date    Heart murmur     Since birth    HTN (hypertension)        Immunizations   Immunization History Administered Date(s) Administered    COVID-19, PFIZER, MRNA, LNP-S, PF, 30MCG/0.3ML DOSE 04/22/2021, 05/03/2021    Influenza Vaccine 09/02/2020    Influenza Vaccine (Quad) PF (>6 Mo Flulaval, Fluarix, and >3 Yrs Tia [place] [id number]) 09/05/2019, 09/02/2020       Social History  Social History     Tobacco Use    Smoking status: Current Every Day Smoker    Smokeless tobacco: Never Used   Substance Use Topics    Alcohol use: Yes    Drug use: Yes     Types: Marijuana       Objective     Visit Vitals  /78 (BP 1 Location: Left upper arm, BP Patient Position: Sitting, BP Cuff Size: Adult)   Pulse 60   Temp 98.3 °F (36.8 °C) (Oral)   Resp 20   Ht 5' 7\" (1.702 m)   Wt 218 lb (98.9 kg)   SpO2 98%   BMI 34.14 kg/m²       Physical Examination  Physical Exam  Constitutional:       General: She is not in acute distress. Appearance: Normal appearance. She is obese. HENT:      Head: Normocephalic and atraumatic. Cardiovascular:      Rate and Rhythm: Normal rate and regular rhythm. Pulmonary:      Effort: No respiratory distress. Musculoskeletal:         General: Normal range of motion. Skin:     General: Skin is warm. Neurological:      General: No focal deficit present. Mental Status: She is alert. Pelvic exam: Normal external female genitalia. Vaginal mucosa pink, moist without significant discharge (thick white moderate discharge) Cervix parous without lesions. Assessment   Cheli Lees is a 61 y.o. who presents to the clinic due to vaginal irritation. Previously tested positive for yeast infection, bacterial vaginosis and trichomonas. She finished the course of antibiotic for BV and Trich, took diflucan for yeast infection. Plan     1. Vaginal itching: It may likely be 2/2 vaginal candidiasis due to symptoms of vaginal itching, white thick discharge. Pt's recent antibiotic use.  Will also re-test for Trichomonas today after treatment.   - NUSWAB VAGINITIS PLUS; Future  - NUSWAB VAGINITIS PLUS          Signed By:  Terry Vitale MD    Family Medicine Resident

## 2021-10-30 LAB
A VAGINAE DNA VAG QL NAA+PROBE: ABNORMAL SCORE
BVAB2 DNA VAG QL NAA+PROBE: ABNORMAL SCORE
C ALBICANS DNA VAG QL NAA+PROBE: POSITIVE
C GLABRATA DNA VAG QL NAA+PROBE: NEGATIVE
C TRACH RRNA SPEC QL NAA+PROBE: NEGATIVE
MEGA1 DNA VAG QL NAA+PROBE: ABNORMAL SCORE
N GONORRHOEA RRNA SPEC QL NAA+PROBE: NEGATIVE
SPECIMEN SOURCE: ABNORMAL
T VAGINALIS RRNA SPEC QL NAA+PROBE: NEGATIVE

## 2021-11-01 NOTE — PROGRESS NOTES
C.albicans +, use OTC Monistat daily for 7 days. Negative for T. Vaginalis, C. Trachomatis, N. Gonorrhoeae.

## 2021-11-03 ENCOUNTER — OFFICE VISIT (OUTPATIENT)
Dept: CARDIOLOGY CLINIC | Age: 56
End: 2021-11-03
Payer: MEDICAID

## 2021-11-03 ENCOUNTER — TELEPHONE (OUTPATIENT)
Dept: CARDIOLOGY CLINIC | Age: 56
End: 2021-11-03

## 2021-11-03 VITALS
OXYGEN SATURATION: 99 % | BODY MASS INDEX: 34.78 KG/M2 | HEART RATE: 57 BPM | HEIGHT: 67 IN | SYSTOLIC BLOOD PRESSURE: 138 MMHG | WEIGHT: 221.6 LBS | RESPIRATION RATE: 16 BRPM | DIASTOLIC BLOOD PRESSURE: 80 MMHG

## 2021-11-03 DIAGNOSIS — R06.02 SOB (SHORTNESS OF BREATH) ON EXERTION: ICD-10-CM

## 2021-11-03 DIAGNOSIS — I10 HYPERTENSION, ESSENTIAL: ICD-10-CM

## 2021-11-03 DIAGNOSIS — R53.83 FATIGUE, UNSPECIFIED TYPE: ICD-10-CM

## 2021-11-03 DIAGNOSIS — R00.2 HEART PALPITATIONS: ICD-10-CM

## 2021-11-03 DIAGNOSIS — R01.1 SYSTOLIC MURMUR: Primary | ICD-10-CM

## 2021-11-03 PROCEDURE — 93000 ELECTROCARDIOGRAM COMPLETE: CPT | Performed by: SPECIALIST

## 2021-11-03 PROCEDURE — 99214 OFFICE O/P EST MOD 30 MIN: CPT | Performed by: SPECIALIST

## 2021-11-03 NOTE — PROGRESS NOTES
Identified pt with two pt identifiers(name and ). Reviewed record in preparation for visit and have obtained necessary documentation. Chief Complaint   Patient presents with    Annual Exam        Health Maintenance Due   Topic    Pneumococcal 0-64 years (1 of 2 - PPSV23)    DTaP/Tdap/Td series (1 - Tdap)    Shingrix Vaccine Age 50> (1 of 2)    Flu Vaccine (1)        Visit Vitals  /86 (BP 1 Location: Left upper arm, BP Patient Position: Sitting, BP Cuff Size: Large adult)   Pulse (!) 57   Resp 16   Ht 5' 7\" (1.702 m)   Wt 221 lb 9.6 oz (100.5 kg)   SpO2 99%   BMI 34.71 kg/m²     Pain Scale: /10    Coordination of Care Questionnaire:  :   1. Have you been to the ER, urgent care clinic since your last visit? Hospitalized since your last visit? No    2. Have you seen or consulted any other health care providers outside of the [address] [place] [address] since your last visit? Include any pap smears or colon screening.  No

## 2021-11-03 NOTE — Clinical Note
11/3/2021 Patient: Miranda Alcaraz YOB: 1965 Date of Visit: 11/3/2021 John Mooney MD 
[address] [address] [address] [employer] Dear John Mooney MD, Thank you for referring Ms. Miranda Alcaraz to [facility] for evaluation. My notes for this consultation are attached. If you have questions, please do not hesitate to call me. I look forward to following your patient along with you.  
 
 
Sincerely, 
 
Barbara Moss MD

## 2021-11-03 NOTE — TELEPHONE ENCOUNTER
----- Message from Raissa Okeefe RN sent at 11/3/2021 10:34 AM EDT -----  Can you order this young lady a 30 day loop for palps?  Thank you

## 2021-11-03 NOTE — PROGRESS NOTES
Tavo Rivas     1965       Vinayak Stearns MD, [facility]  Date of Visit-11/3/2021   PCP is Pedro Rojas MD   [facility] [place]  [facility]  HPI:  Tavo Rivas is a 61 y.o. female   Last visit 7/31/19. F/u for HTN, systolic murmur, occ Heart Palps. Has HTN , did not tolerate amlodipine (Norvasc) , did not want diuretic, given ACE but not taking    Today. .. Pt seen two years ago for systolic murmur, Hx of HTN. An echo showed no significant valve disorder. Pt is here because of increased heart palpitations. Pt is accompanied by her . Pt states that her palpitations started about 2 months ago, and notes that it is accompanied by a sharp pain in her chest. Pt reports that there was one episode of palps where it lasted the entire night. She says that she has palpitation episodes about twice a month, and last for a significant amount of time. She notes that she is under significant amounts of stress. Pt is a smoker, and notes some occasional heavy breathing. Denies edema, syncope or shortness of breath at rest    EKG: SB WNL normal axis and intervals     Assessment/Plan:     1. Systolic murmur  *previously no obvious source on echo. Will repeat and also focus on the descending aorta to look for coarctation. If need will get a CT scan. 08/01/19    ECHO ADULT COMPLETE 08/04/2019 8/4/2019    Interpretation Summary  · Left Ventricle: Normal cavity size, systolic function (ejection fraction normal) and diastolic function. Mildly to moderately increased wall thickness. Estimated left ventricular ejection fraction is 61 - 65%. Biplane method used to measure ejection fraction. No regional wall motion abnormality noted. · Left Atrium: Mildly dilated left atrium. · Mitral Valve: Trace mitral valve regurgitation. Signed by: David Brewster MD on 8/4/2019 12:47 AM      2. Hypertension, essential  Elevated. Is anxious over her sx's.    At goal , meds and possible side effects reviewed and patient denies  Key CAD CHF Meds             atorvastatin (LIPITOR) 40 mg tablet (Taking) Take 1 tablet by mouth once daily    atenoloL (TENORMIN) 25 mg tablet (Taking) Take 1 tablet by mouth once daily         BP Readings from Last 6 Encounters:   11/03/21 138/80   10/26/21 139/78   08/26/21 (!) 148/81   05/21/21 138/83   03/02/20 139/75   09/05/19 138/80        3. Heart palpitations  Have increased and have more prolonged episodes even if not frequent. EKG normal  4. Fatigue, unspecified type  Will check stress nuke. multiple risk factors and now has chest discomfort. 5. SOB (shortness of breath) on exertion  Patient Instructions   You have been scheduled for an exercise nuclear stress test and an echocardiogram. A 30 day loop monitor has been ordered for you. This will be mailed to the address given to us. Please read over the instructions given to you about [employer] loop monitors. If you have any insurance questions regarding coverage and out of pocket cost please contact the number below. If you have any billing questions regarding deductible or responsibility call [phone]. If you need additional supplies or issue with your monitor please call [phone]. We will call with results and see you back in 4 months. Nuclear Stress Test:    Nuclear stress testing evaluates blood flow to your heart muscle and assesses cardiac function. There are 2 parts (Rest/Stress) to this procedure and will include exercise on a treadmill. *Please arrive 15 minutes prior to your appointment time    Test Duration:    -One day testing will take 4 hours    Day of testing instructions:    1. NO CAFFEINE (not even decaffeinated products) 24 HOURS PRIOR TO TESTING. This includes coffee, soda, tea, chocolate, multivitamins, and migraine medication, like Excedrin or Fioricet that contains caffeine. 2. Nothing to eat or drink 4 HOURS prior to testing  3.  NO NICOTINE 12 hours prior to testing  4. Hold any medications requested by your cardiologist. Otherwise take medications as directed with a few sips of water. If you are unsure you may bring your medications with you to take after instructed by your stressing nurse. It is recommended you hold atenolol 24 hours prior to your test.   5. Wear comfortable clothes and shoes (Shirts with no metal, shorts or pants, tennis shoes, no heels or flip flops)    IMPORTANT: This testing involves a cardiac tracer ordered specifically for you. If you are unable to make your appointment, please call to cancel/reschedule AT LEAST 24 hours prior to your appointment so your tracer can be cancelled. [phone]. F/u in 3 months at [place]     Impression:   1. Systolic murmur    2. Hypertension, essential    3. Heart palpitations    4. Fatigue, unspecified type    5. SOB (shortness of breath) on exertion       Cardiac History:   No specialty comments available. No future appointments. ROS-except as noted above. . A complete cardiac and respiratory are reviewed and negative except as above ; Resp-denies wheezing  or productive cough,. Const- No unusual weight loss or fever; Neuro-no recent seizure or CVA ; GI- No BRBPR, abdom pain, bloating ; - no  hematuria   see supplement sheet, initialed and to be scanned by staff  Past Medical History:   Diagnosis Date    Heart murmur     Since birth    HTN (hypertension)       Social Hx= reports that she has been smoking. She has never used smokeless tobacco. She reports current alcohol use. She reports current drug use. Drug: Marijuana. Exam and Labs:  /86 (BP 1 Location: Left upper arm, BP Patient Position: Sitting, BP Cuff Size: Large adult)   Pulse (!) 57   Resp 16   Ht 5' 7\" (1.702 m)   Wt 221 lb 9.6 oz (100.5 kg)   SpO2 99%   BMI 34.71 kg/m² Constitutional:  NAD, comfortable  Head: NC,AT. Eyes: No scleral icterus. Neck:  Neck supple. No JVD present. Throat: moist mucous membranes. Chest: Effort normal & normal respiratory excursion . Neurological: alert, conversant and oriented . Skin: Skin is not cold. No obvious systemic rash noted. Not diaphoretic. No erythema. Psychiatric:  Grossly normal mood and affect. Behavior appears normal. Extremities:  no clubbing or cyanosis. Abdomen: non distended    Lungs:breath sounds normal. No stridor. distress, wheezes or  Rales. Heart:2/6 DARRELL, loudest at RUSB but heard at both clavicles, diminishes at the apex, and does not particularly radiate to carotids,  normal rate, regular rhythm, normal S1, S2, no rubs, clicks or gallops , PMI non displaced. Edema: Edema is none. Lab Results   Component Value Date/Time    Cholesterol, total 206 (H) 06/09/2021 09:10 AM    HDL Cholesterol 57 06/09/2021 09:10 AM    LDL, calculated 135.8 (H) 06/09/2021 09:10 AM    Triglyceride 66 06/09/2021 09:10 AM    CHOL/HDL Ratio 3.6 06/09/2021 09:10 AM     Lab Results   Component Value Date/Time    Sodium 140 06/09/2021 09:10 AM    Potassium 4.7 06/09/2021 09:10 AM    Chloride 106 06/09/2021 09:10 AM    CO2 27 06/09/2021 09:10 AM    Anion gap 7 06/09/2021 09:10 AM    Glucose 87 06/09/2021 09:10 AM    BUN 12 06/09/2021 09:10 AM    Creatinine 0.85 06/09/2021 09:10 AM    BUN/Creatinine ratio 14 06/09/2021 09:10 AM    GFR est AA >60 06/09/2021 09:10 AM    GFR est non-AA >60 06/09/2021 09:10 AM    Calcium 9.4 06/09/2021 09:10 AM      Wt Readings from Last 3 Encounters:   11/03/21 221 lb 9.6 oz (100.5 kg)   10/26/21 218 lb (98.9 kg)   08/26/21 215 lb (97.5 kg)      BP Readings from Last 3 Encounters:   11/03/21 136/86   10/26/21 139/78   08/26/21 (!) 148/81      Current Outpatient Medications   Medication Sig    meloxicam (MOBIC) 15 mg tablet Take 1 Tablet by mouth daily as needed for Pain.     atorvastatin (LIPITOR) 40 mg tablet Take 1 tablet by mouth once daily    atenoloL (TENORMIN) 25 mg tablet Take 1 tablet by mouth once daily    multivit-min/iron/folic/lutein (CENTRUM SILVER WOMEN PO)     levocetirizine (XYZAL) 5 mg tablet Take 1 Tablet by mouth daily.  montelukast (SINGULAIR) 10 mg tablet Take 1 Tablet by mouth daily.  Premarin 0.625 mg/gram vaginal cream INSERT 0.5 GRAMS VAGINALLY DAILY.  polyethylene glycol (MIRALAX) 17 gram/dose powder Take 17 g by mouth daily.  cholecalciferol (VITAMIN D3) 1,000 unit cap Take  by mouth daily.  calcium carbonate-vitamin D3 (CALTRATE 600 + D) 600 mg (1,500 mg)-800 unit chew Take  by mouth. No current facility-administered medications for this visit. Impression see above.       Written by Kaylee Downey, as dictated by Gus Foss MD.

## 2021-11-03 NOTE — PATIENT INSTRUCTIONS
You have been scheduled for an exercise nuclear stress test and an echocardiogram. A 30 day loop monitor has been ordered for you. This will be mailed to the address given to us. Please read over the instructions given to you about [employer] loop monitors. If you have any insurance questions regarding coverage and out of pocket cost please contact the number below. If you have any billing questions regarding deductible or responsibility call [phone]. If you need additional supplies or issue with your monitor please call [phone]. We will call with results and see you back in 4 months. Nuclear Stress Test:    Nuclear stress testing evaluates blood flow to your heart muscle and assesses cardiac function. There are 2 parts (Rest/Stress) to this procedure and will include exercise on a treadmill. *Please arrive 15 minutes prior to your appointment time    Test Duration:    -One day testing will take 4 hours    Day of testing instructions:    1. NO CAFFEINE (not even decaffeinated products) 24 HOURS PRIOR TO TESTING. This includes coffee, soda, tea, chocolate, multivitamins, and migraine medication, like Excedrin or Fioricet that contains caffeine. 2. Nothing to eat or drink 4 HOURS prior to testing  3. NO NICOTINE 12 hours prior to testing  4. Hold any medications requested by your cardiologist. Otherwise take medications as directed with a few sips of water. If you are unsure you may bring your medications with you to take after instructed by your stressing nurse. It is recommended you hold atenolol 24 hours prior to your test.   5. Wear comfortable clothes and shoes (Shirts with no metal, shorts or pants, tennis shoes, no heels or flip flops)    IMPORTANT: This testing involves a cardiac tracer ordered specifically for you. If you are unable to make your appointment, please call to cancel/reschedule AT LEAST 24 hours prior to your appointment so your tracer can be cancelled. [phone].

## 2021-11-19 ENCOUNTER — TELEPHONE (OUTPATIENT)
Dept: CARDIOLOGY CLINIC | Age: 56
End: 2021-11-19

## 2021-11-19 NOTE — TELEPHONE ENCOUNTER
Verified patient with two patient identifiers. Spoke with patient regarding her loop monitor. Advised patient per  to stop Atenolol. Appointment given on Monday (11/22/21) @ 9:40. Patient verbalized understanding.

## 2021-11-19 NOTE — TELEPHONE ENCOUNTER
----- Message from Jerrod Yoo MD sent at 11/19/2021  2:36 PM EST -----  Regarding: RE: Serious loop monitor event  [id number]  [place] so stop her atenolol, check on her and see if Mg Padilla or Shelbie Baires could see her next week so she wont have as far to travel  ----- Message -----  From: Deangelo Ashraf  Sent: 11/19/2021  12:16 PM EST  To: Jerrod Yoo MD, [place] [facility], LPN  Subject: Serious loop monitor event                       Fax received from [employer] for 2nd degree AVB Type 2 on 11-18-21 at 10:50 PM with a HR of 40 bpm. Strips on your desk for review.

## 2021-11-19 NOTE — TELEPHONE ENCOUNTER
----- Message from Daniel Gipson MD sent at 11/19/2021  2:35 PM EST -----  Regarding: RE: Serious loop monitor event  Call pt and see if any symptoms  Have her come in Monday at 940  ----- Message -----  From: Chase Reason: 11/19/2021  12:16 PM EST  To: Daniel Gipson MD, Edna Sales LPN  Subject: Serious loop monitor event                       Fax received from [employer] for 2nd degree AVB Type 2 on 11-18-21 at 10:50 PM with a HR of 40 bpm. Strips on your desk for review.

## 2021-11-19 NOTE — TELEPHONE ENCOUNTER
Verified patient with two patient identifiers. Spoke with patient regarding her loop monitor. Per patient she's okay except for being lightheaded. Appointment given to her on Monday @ 9:40 AM.  Patient verbalized understanding.

## 2021-11-22 ENCOUNTER — OFFICE VISIT (OUTPATIENT)
Dept: CARDIOLOGY CLINIC | Age: 56
End: 2021-11-22
Payer: MEDICAID

## 2021-11-22 VITALS
HEART RATE: 88 BPM | OXYGEN SATURATION: 98 % | HEIGHT: 67 IN | DIASTOLIC BLOOD PRESSURE: 80 MMHG | BODY MASS INDEX: 34.37 KG/M2 | RESPIRATION RATE: 16 BRPM | WEIGHT: 219 LBS | SYSTOLIC BLOOD PRESSURE: 138 MMHG

## 2021-11-22 DIAGNOSIS — R06.02 SOB (SHORTNESS OF BREATH) ON EXERTION: ICD-10-CM

## 2021-11-22 DIAGNOSIS — I44.1 HEART BLOCK AV SECOND DEGREE: Primary | ICD-10-CM

## 2021-11-22 DIAGNOSIS — R00.2 HEART PALPITATIONS: ICD-10-CM

## 2021-11-22 DIAGNOSIS — I10 HYPERTENSION, ESSENTIAL: ICD-10-CM

## 2021-11-22 DIAGNOSIS — R01.1 SYSTOLIC MURMUR: ICD-10-CM

## 2021-11-22 PROCEDURE — 93000 ELECTROCARDIOGRAM COMPLETE: CPT | Performed by: SPECIALIST

## 2021-11-22 PROCEDURE — 99214 OFFICE O/P EST MOD 30 MIN: CPT | Performed by: SPECIALIST

## 2021-11-22 NOTE — PROGRESS NOTES
Minh Vanegas     1965       Vinayak Umana MD, [facility] [place]  Date of Visit-11/22/2021   PCP is Rosalba Cazares MD   [facility] [place]  [facility]  HPI:  Minh Vanegas is a 61 y.o. female   2 week f/u of HTN, systolic murmur, occ Heart Palps. Has HTN , did not tolerate amlodipine (Noezac Malagonson, did not want diuretic, given ACE but not taking  Pt seen in 1733 for systolic murmur, Hx of HTN. An echo showed no significant valve disorder. Today. .. Pt has noticed increased palps and stress. With her systolic murmur we repeated the echo. Due to palps we had a loop monitor on 11/18/21 at 10:50 pm. Had 2 to 1 Heart block. On atenolol 25 mg. She was contacted to stop the atenolol. Her echo and nuke are planned for 11/29/21. Pt states that she had noticed a few episodes of palpitations. She reports it as a \"gas\" like pain in her right abdominal side. She notes that her mother and father's mental conditions have been giving her increased stress. Denies chest pain, edema, syncope or shortness of breath at rest, has no tachycardia, palpitations or sense of arrhythmia. EKG: SR WNL heart rate 73   QRS 98  Assessment/Plan:     1. Heart block AV second degree  Pt with one episode. Has had no new palps stopping the atenolol. She has another 20 days on the monitor  and we will see her response. At this point I don't think a pacer is indicated. 2. Heart palpitations  Palpitations. , continue monitoring. f/u in January. 3. Systolic murmur  Previous echo did not reveal a source. It is more  A grade 1 than grade 2. She is not in overt failure. Given her sx's decided to proceed with stress testing a  Echo which will be done next week. 4. SOB (shortness of breath) on exertion  Echo and stress test, results TBD  No new edema is noted.      5. Hypertension, essential  At goal , meds and possible side effects reviewed and patient denies  Key CAD CHF Meds atorvastatin (LIPITOR) 40 mg tablet (Taking) Take 1 tablet by mouth once daily         BP Readings from Last 6 Encounters:   11/22/21 138/80   11/03/21 138/80   10/26/21 139/78   08/26/21 (!) 148/81   05/21/21 138/83   03/02/20 139/75             Will call with results, f/u in January marques     Impression:   1. Heart block AV second degree    2. Heart palpitations    3. Systolic murmur    4. SOB (shortness of breath) on exertion    5. Hypertension, essential       Cardiac History:   No specialty comments available. Future Appointments   Date Time Provider Kyung Teague   11/29/2021 11:00 AM ECHOTWOALFREDO BS AMB   11/29/2021 12:30 PM NUCLEAR, MYLAANCPEE [facility]  AMB   1/12/2022 11:00 AM Vinayak Zamudio MD [facility] BS AMB      Patient Care Team:  Yefri Russ MD as PCP - General (Family Medicine)  Yefri Russ MD as PCP - [address] [place], [place] [address]trenton Suarezled Provider  Karrie Machado MD as Consulting Provider (Cardiology)   ROS-except as noted above. . A complete cardiac and respiratory are reviewed and negative except as above ; Resp-denies wheezing  or productive cough,. Const- No unusual weight loss or fever; Neuro-no recent seizure or CVA ; GI- No BRBPR, abdom pain, bloating ; - no  hematuria   see supplement sheet, initialed and to be scanned by staff  Past Medical History:   Diagnosis Date    Heart murmur     Since birth    HTN (hypertension)       Social Hx= reports that she has been smoking. She has never used smokeless tobacco. She reports current alcohol use. She reports current drug use. Drug: Marijuana. Exam and Labs:  /80   Pulse 88   Resp 16   Ht 5' 7\" (1.702 m)   Wt 219 lb (99.3 kg)   SpO2 98%   BMI 34.30 kg/m² Constitutional:  NAD, comfortable  Head: NC,AT. Eyes: No scleral icterus. Neck:  Neck supple. No JVD present. Throat: moist mucous membranes. Chest: Effort normal & normal respiratory excursion . Neurological: alert, conversant and oriented .  Skin: Skin is not cold. No obvious systemic rash noted. Not diaphoretic. No erythema. Psychiatric:  Grossly normal mood and affect. Behavior appears normal. Extremities:  no clubbing or cyanosis. Abdomen: non distended    Lungs:breath sounds normal. No stridor. distress, wheezes or  Rales. Heart:1-2/6 short medium to low pitch systolic murmur throughout normal rate, regular rhythm, normal S1, S2, no murmurs, rubs, clicks or gallops , PMI non displaced. Edema: Edema is none. Lab Results   Component Value Date/Time    Cholesterol, total 206 (H) 06/09/2021 09:10 AM    HDL Cholesterol 57 06/09/2021 09:10 AM    LDL, calculated 135.8 (H) 06/09/2021 09:10 AM    Triglyceride 66 06/09/2021 09:10 AM    CHOL/HDL Ratio 3.6 06/09/2021 09:10 AM     Lab Results   Component Value Date/Time    Sodium 140 06/09/2021 09:10 AM    Potassium 4.7 06/09/2021 09:10 AM    Chloride 106 06/09/2021 09:10 AM    CO2 27 06/09/2021 09:10 AM    Anion gap 7 06/09/2021 09:10 AM    Glucose 87 06/09/2021 09:10 AM    BUN 12 06/09/2021 09:10 AM    Creatinine 0.85 06/09/2021 09:10 AM    BUN/Creatinine ratio 14 06/09/2021 09:10 AM    GFR est AA >60 06/09/2021 09:10 AM    GFR est non-AA >60 06/09/2021 09:10 AM    Calcium 9.4 06/09/2021 09:10 AM      Wt Readings from Last 3 Encounters:   11/22/21 219 lb (99.3 kg)   11/03/21 221 lb 9.6 oz (100.5 kg)   10/26/21 218 lb (98.9 kg)      BP Readings from Last 3 Encounters:   11/22/21 138/80   11/03/21 138/80   10/26/21 139/78      Current Outpatient Medications   Medication Sig    meloxicam (MOBIC) 15 mg tablet Take 1 Tablet by mouth daily as needed for Pain.  atorvastatin (LIPITOR) 40 mg tablet Take 1 tablet by mouth once daily    multivit-min/iron/folic/lutein (CENTRUM SILVER WOMEN PO)     levocetirizine (XYZAL) 5 mg tablet Take 1 Tablet by mouth daily.  montelukast (SINGULAIR) 10 mg tablet Take 1 Tablet by mouth daily.  Premarin 0.625 mg/gram vaginal cream INSERT 0.5 GRAMS VAGINALLY DAILY.     polyethylene glycol (MIRALAX) 17 gram/dose powder Take 17 g by mouth daily.  cholecalciferol (VITAMIN D3) 1,000 unit cap Take  by mouth daily.  calcium carbonate-vitamin D3 (CALTRATE 600 + D) 600 mg (1,500 mg)-800 unit chew Take  by mouth. No current facility-administered medications for this visit. Impression see above.       Written by Aziza Garcia, as dictated by Cait Stearns MD.

## 2021-11-22 NOTE — Clinical Note
11/22/2021    Patient: Yehuda Gusman   YOB: 1965   Date of Visit: 11/22/2021     Dunia Hansen MD  [address]  [address]    Dear Dunia Hansen MD,      Thank you for referring Ms. Yehuda Gusman to [facility] for evaluation. My notes for this consultation are attached. If you have questions, please do not hesitate to call me. I look forward to following your patient along with you.       Sincerely,    Shree Harrison MD

## 2021-11-29 ENCOUNTER — ANCILLARY PROCEDURE (OUTPATIENT)
Dept: CARDIOLOGY CLINIC | Age: 56
End: 2021-11-29

## 2021-11-29 ENCOUNTER — ANCILLARY PROCEDURE (OUTPATIENT)
Dept: CARDIOLOGY CLINIC | Age: 56
End: 2021-11-29
Payer: MEDICAID

## 2021-11-29 VITALS
SYSTOLIC BLOOD PRESSURE: 136 MMHG | BODY MASS INDEX: 34.69 KG/M2 | HEIGHT: 67 IN | DIASTOLIC BLOOD PRESSURE: 70 MMHG | WEIGHT: 221 LBS

## 2021-11-29 VITALS — BODY MASS INDEX: 34.69 KG/M2 | WEIGHT: 221 LBS | HEIGHT: 67 IN

## 2021-11-29 DIAGNOSIS — R53.83 FATIGUE, UNSPECIFIED TYPE: ICD-10-CM

## 2021-11-29 DIAGNOSIS — R00.2 HEART PALPITATIONS: ICD-10-CM

## 2021-11-29 DIAGNOSIS — I10 HYPERTENSION, ESSENTIAL: ICD-10-CM

## 2021-11-29 DIAGNOSIS — R06.02 SOB (SHORTNESS OF BREATH) ON EXERTION: ICD-10-CM

## 2021-11-29 DIAGNOSIS — R01.1 SYSTOLIC MURMUR: ICD-10-CM

## 2021-11-29 LAB
ECHO AO ASC DIAM: 3.14 CM
ECHO AO ROOT DIAM: 2.68 CM
ECHO AV MEAN GRADIENT: 10.12 MMHG
ECHO AV PEAK GRADIENT: 22.03 MMHG
ECHO AV PEAK VELOCITY: 234.67 CM/S
ECHO AV VTI: 46.57 CM
ECHO EST RA PRESSURE: 3 MMHG
ECHO IVC PROX: 1.55 CM
ECHO LA AREA 4C: 28.54 CM2
ECHO LA MAJOR AXIS: 4.37 CM
ECHO LA MINOR AXIS: 2.07 CM
ECHO LA VOL 2C: 85.55 ML (ref 22–52)
ECHO LA VOL 4C: 106.07 ML (ref 22–52)
ECHO LA VOL BP: 101.97 ML (ref 22–52)
ECHO LA VOL/BSA BIPLANE: 48.33 ML/M2 (ref 16–28)
ECHO LA VOLUME INDEX A2C: 40.55 ML/M2 (ref 16–28)
ECHO LA VOLUME INDEX A4C: 50.27 ML/M2 (ref 16–28)
ECHO LV E' LATERAL VELOCITY: 7.97 CM/S
ECHO LV E' SEPTAL VELOCITY: 7.92 CM/S
ECHO LV EDV A2C: 110.66 ML
ECHO LV EDV A4C: 130.07 ML
ECHO LV EDV BP: 121.7 ML (ref 56–104)
ECHO LV EDV INDEX A4C: 61.6 ML/M2
ECHO LV EDV INDEX BP: 57.7 ML/M2
ECHO LV EDV NDEX A2C: 52.4 ML/M2
ECHO LV EJECTION FRACTION A2C: 75 PERCENT
ECHO LV EJECTION FRACTION A4C: 76 PERCENT
ECHO LV EJECTION FRACTION BIPLANE: 76 PERCENT (ref 55–100)
ECHO LV ESV A2C: 27.51 ML
ECHO LV ESV A4C: 30.89 ML
ECHO LV ESV BP: 29.21 ML (ref 19–49)
ECHO LV ESV INDEX A2C: 13 ML/M2
ECHO LV ESV INDEX A4C: 14.6 ML/M2
ECHO LV ESV INDEX BP: 13.8 ML/M2
ECHO LV INTERNAL DIMENSION DIASTOLIC: 4.57 CM (ref 3.9–5.3)
ECHO LV INTERNAL DIMENSION SYSTOLIC: 2.86 CM
ECHO LV IVSD: 0.98 CM (ref 0.6–0.9)
ECHO LV MASS 2D: 159.3 G (ref 67–162)
ECHO LV MASS INDEX 2D: 75.5 G/M2 (ref 43–95)
ECHO LV POSTERIOR WALL DIASTOLIC: 1.04 CM (ref 0.6–0.9)
ECHO LVOT DIAM: 1.96 CM
ECHO MV A VELOCITY: 98.32 CM/S
ECHO MV E DECELERATION TIME (DT): 234.72 MS
ECHO MV E VELOCITY: 104.91 CM/S
ECHO MV E/A RATIO: 1.07
ECHO MV E/E' LATERAL: 13.16
ECHO MV E/E' RATIO (AVERAGED): 13.2
ECHO MV E/E' SEPTAL: 13.25
ECHO MV MAX VELOCITY: 122.67 CM/S
ECHO MV MEAN GRADIENT: 2.52 MMHG
ECHO MV PEAK GRADIENT: 6.02 MMHG
ECHO MV PRESSURE HALF TIME (PHT): 68.07 MS
ECHO MV VTI: 34.49 CM
ECHO RA AREA 4C: 25.16 CM2
ECHO RIGHT VENTRICULAR SYSTOLIC PRESSURE (RVSP): 36.25 MMHG
ECHO RV TAPSE: 2.74 CM (ref 1.5–2)
ECHO TV REGURGITANT MAX VELOCITY: 288.32 CM/S
ECHO TV REGURGITANT PEAK GRADIENT: 33.25 MMHG

## 2021-11-29 PROCEDURE — 93306 TTE W/DOPPLER COMPLETE: CPT | Performed by: SPECIALIST

## 2021-11-29 PROCEDURE — 78452 HT MUSCLE IMAGE SPECT MULT: CPT | Performed by: SPECIALIST

## 2021-11-29 PROCEDURE — A9500 TC99M SESTAMIBI: HCPCS | Performed by: SPECIALIST

## 2021-11-29 PROCEDURE — 93015 CV STRESS TEST SUPVJ I&R: CPT | Performed by: SPECIALIST

## 2021-11-29 RX ORDER — TETRAKIS(2-METHOXYISOBUTYLISOCYANIDE)COPPER(I) TETRAFLUOROBORATE 1 MG/ML
8.6 INJECTION, POWDER, LYOPHILIZED, FOR SOLUTION INTRAVENOUS ONCE
Status: COMPLETED | OUTPATIENT
Start: 2021-11-29 | End: 2021-11-29

## 2021-11-29 RX ORDER — TETRAKIS(2-METHOXYISOBUTYLISOCYANIDE)COPPER(I) TETRAFLUOROBORATE 1 MG/ML
24.4 INJECTION, POWDER, LYOPHILIZED, FOR SOLUTION INTRAVENOUS ONCE
Status: COMPLETED | OUTPATIENT
Start: 2021-11-29 | End: 2021-11-29

## 2021-11-29 RX ADMIN — TETRAKIS(2-METHOXYISOBUTYLISOCYANIDE)COPPER(I) TETRAFLUOROBORATE 24.4 MILLICURIE: 1 INJECTION, POWDER, LYOPHILIZED, FOR SOLUTION INTRAVENOUS at 13:45

## 2021-11-29 RX ADMIN — TETRAKIS(2-METHOXYISOBUTYLISOCYANIDE)COPPER(I) TETRAFLUOROBORATE 8.6 MILLICURIE: 1 INJECTION, POWDER, LYOPHILIZED, FOR SOLUTION INTRAVENOUS at 12:15

## 2021-12-04 LAB
STRESS BASELINE DIAS BP: 82 MMHG
STRESS BASELINE HR: 76 BPM
STRESS BASELINE SYS BP: 134 MMHG
STRESS ESTIMATED WORKLOAD: 10.1 METS
STRESS EXERCISE DUR MIN: NORMAL
STRESS O2 SAT PEAK: 100 %
STRESS O2 SAT REST: 98 %
STRESS PEAK DIAS BP: 92 MMHG
STRESS PEAK SYS BP: 180 MMHG
STRESS PERCENT HR ACHIEVED: 87 %
STRESS POST PEAK HR: 142 BPM
STRESS RATE PRESSURE PRODUCT: NORMAL BPM*MMHG
STRESS ST DEPRESSION: 0 MM
STRESS ST ELEVATION: 0 MM
STRESS TARGET HR: 163 BPM

## 2021-12-20 ENCOUNTER — TELEPHONE (OUTPATIENT)
Dept: CARDIOLOGY CLINIC | Age: 56
End: 2021-12-20

## 2021-12-21 NOTE — TELEPHONE ENCOUNTER
----- Message from Edson Iglesias RN sent at 12/14/2021  8:33 AM EST -----  Patient's 30 day loop ready for review.

## 2021-12-21 NOTE — TELEPHONE ENCOUNTER
30 Day Loop monitor starting November 11, 2021 completed    1. During the monitoring ,  sinus rhythm with second-degree AV block type II was detected. 2. 2:1 AV block was seen on 11/18/2021 at 10:50 PM.  3. Other manual events included sinus rhythm with PVCs. 4. Patient had 6% bradycardia 2% tachycardia with an average heart rate of 73 and heart rate varied between 41 and 142.    5. There was no atrial fibrillation. 6. Patient activated monitor for symptom of \"fluttering\" showed sinus rhythm.

## 2021-12-22 NOTE — TELEPHONE ENCOUNTER
We knew about the one episode of 2-1 block on 11-18-21 when last seen in office, single brief episode   Remainder of monitor looks good with no arrhythmia and good HR response  No change in plans, does not need pacer or change in meds  Keep follow up  Reassure pt  Future Appointments   Date Time Provider Kyung Zahida   1/12/2022 11:00 AM Minnie Whitehead MD [facility] BS AMB

## 2022-01-12 ENCOUNTER — OFFICE VISIT (OUTPATIENT)
Dept: CARDIOLOGY CLINIC | Facility: CLINIC | Age: 57
End: 2022-01-12
Payer: MEDICAID

## 2022-01-12 VITALS
HEIGHT: 67 IN | SYSTOLIC BLOOD PRESSURE: 150 MMHG | BODY MASS INDEX: 34.37 KG/M2 | TEMPERATURE: 97.4 F | DIASTOLIC BLOOD PRESSURE: 65 MMHG | WEIGHT: 219 LBS | RESPIRATION RATE: 16 BRPM | HEART RATE: 67 BPM | OXYGEN SATURATION: 98 %

## 2022-01-12 DIAGNOSIS — R01.1 SYSTOLIC MURMUR: ICD-10-CM

## 2022-01-12 DIAGNOSIS — I44.1 HEART BLOCK AV SECOND DEGREE: ICD-10-CM

## 2022-01-12 DIAGNOSIS — R00.2 HEART PALPITATIONS: ICD-10-CM

## 2022-01-12 DIAGNOSIS — I10 HYPERTENSION, ESSENTIAL: Primary | ICD-10-CM

## 2022-01-12 PROCEDURE — 99214 OFFICE O/P EST MOD 30 MIN: CPT | Performed by: SPECIALIST

## 2022-01-12 RX ORDER — HYDROCHLOROTHIAZIDE 12.5 MG/1
12.5 TABLET ORAL DAILY
Qty: 90 TABLET | Refills: 2 | Status: SHIPPED | OUTPATIENT
Start: 2022-01-12 | End: 2022-02-09 | Stop reason: SDUPTHER

## 2022-01-12 NOTE — PROGRESS NOTES
1. Have you been to the ER, urgent care clinic since your last visit? Hospitalized since your last visit? No    2. Have you seen or consulted any other health care providers outside of the [address] [place] [address] since your last visit? Include any pap smears or colon screening.  No  Reviewed record in preparation for visit and have necessary documentation  Pt did not bring medication to office visit for review    Goals that were addressed and/or need to be completed during or after this appointment include   Health Maintenance Due   Topic Date Due    Pneumococcal 0-64 years (1 of 2 - PPSV23) Never done    DTaP/Tdap/Td series (1 - Tdap) Never done    Shingrix Vaccine Age 50> (1 of 2) Never done    Flu Vaccine (1) 09/01/2021    COVID-19 Vaccine (3 - Booster) 11/03/2021

## 2022-01-12 NOTE — PROGRESS NOTES
Marcel Wang     1965       Vinayak Zamudio MD, [facility] [place]  Date of Visit-1/12/2022   PCP is Faby Gonzalez MD   [facility] and [facility] [place]  [facility]  HPI:  Marcel Wang is a 61 y.o. female last OV 11-22-21  Today in [place]  Has HTN, LVOT obstruction and LVH and with beta blocker got heart block, here with , feeling better,BP remain high   f/u of HTN, systolic murmur, occ Heart Palps. Has HTN , did not tolerate amlodipine (Shruti Ortez, did not want diuretic, given ACE but not taking  Pt seen in 8430 for systolic murmur, Hx of HTN. An echo showed no significant valve disorder. Today. .. Less  increased palps and stress. With her systolic murmur we repeated the echo. Has 2 m/sec LVOT gradient, normal EF, no AS  Due to palps we had a loop monitor on 11/18/21 at 10:50 pm. Had 2 to 1 Heart block. On atenolol 25 mg. She was contacted to stop the atenolol. Nuclear was normal   Denies chest pain, edema, syncope or shortness of breath at rest, has no tachycardia, palpitations or sense of arrhythmia.    sedentary  Assessment/Plan:     1. Hypertension, essential  --main issue today is HTN\  Has LVH on echo, reviewed all testing  She needs more exercise, says she does not add salt  Very willing to try other BP meds, did not tolerate BB or amlodipine (Norvasc)    Discussed ACE or diuretic  Will start HCTZ at low dose, keep diary RTC one month  - hydroCHLOROthiazide (HYDRODIURIL) 12.5 mg tablet; Take 1 Tablet by mouth daily. Dispense: 90 Tablet; Refill: 2    2. Systolic murmur  Moderate and due to LVOT , unlikely to cause current issues  Fu echo every few years    3. Heart palpitations  Improved, now off BB    4. Heart block AV second degree  Due to BB    Torres CAD CHF Meds             hydroCHLOROthiazide (HYDRODIURIL) 12.5 mg tablet (Taking) Take 1 Tablet by mouth daily.     atorvastatin (LIPITOR) 40 mg tablet (Taking) Take 1 tablet by mouth once daily         BP Readings from Last 6 Encounters:   01/12/22 (!) 150/65   11/29/21 136/70   11/22/21 138/80   11/03/21 138/80   10/26/21 139/78   08/26/21 (!) 148/81       Fu one month with BP diary with HCTZ 12.5 mg daily     Impression:   1. Hypertension, essential    2. Systolic murmur    3. Heart palpitations    4. Heart block AV second degree       Cardiac History:   No specialty comments available. Future Appointments   Date Time Provider Kyung Teague   2/9/2022  1:40 PM Andreina Whitney MD [facility] BS AMB      Patient Care Team:  Marnie Peres MD as PCP - General (Family Medicine)  Marnie Peres MD as PCP - [facility] Empaneled Provider  Andreina Whitney MD as Consulting Provider (Cardiology)   ROS-except as noted above. . A complete cardiac and respiratory are reviewed and negative except as above ; Resp-denies wheezing  or productive cough,. Const- No unusual weight loss or fever; Neuro-no recent seizure or CVA ; GI- No BRBPR, abdom pain, bloating ; - no  hematuria   see supplement sheet, initialed and to be scanned by staff  Past Medical History:   Diagnosis Date    Heart murmur     Since birth    HTN (hypertension)       Social Hx= reports that she has been smoking. She has never used smokeless tobacco. She reports current alcohol use. She reports current drug use. Drug: Marijuana. Exam and Labs:  BP (!) 150/65   Pulse 67   Temp 97.4 °F (36.3 °C)   Resp 16   Ht 5' 7\" (1.702 m)   Wt 219 lb (99.3 kg)   SpO2 98%   BMI 34.30 kg/m² Constitutional:  NAD, comfortable  Head: NC,AT. Eyes: No scleral icterus. Neck:  Neck supple. No JVD present. Throat: moist mucous membranes. Chest: Effort normal & normal respiratory excursion . Neurological: alert, conversant and oriented . Skin: Skin is not cold. No obvious systemic rash noted. Not diaphoretic. No erythema. Psychiatric:  Grossly normal mood and affect. Behavior appears normal. Extremities:  no clubbing or cyanosis.  Abdomen: non distended    Lungs:breath sounds normal. No stridor. distress, wheezes or  Rales. Heart:1-2/6 short medium to low pitch systolic murmur throughout normal rate, regular rhythm, normal S1, S2, no murmurs, rubs, clicks or gallops , PMI non displaced. Edema: Edema is none. Lab Results   Component Value Date/Time    Cholesterol, total 206 (H) 06/09/2021 09:10 AM    HDL Cholesterol 57 06/09/2021 09:10 AM    LDL, calculated 135.8 (H) 06/09/2021 09:10 AM    Triglyceride 66 06/09/2021 09:10 AM    CHOL/HDL Ratio 3.6 06/09/2021 09:10 AM     Lab Results   Component Value Date/Time    Sodium 140 06/09/2021 09:10 AM    Potassium 4.7 06/09/2021 09:10 AM    Chloride 106 06/09/2021 09:10 AM    CO2 27 06/09/2021 09:10 AM    Anion gap 7 06/09/2021 09:10 AM    Glucose 87 06/09/2021 09:10 AM    BUN 12 06/09/2021 09:10 AM    Creatinine 0.85 06/09/2021 09:10 AM    BUN/Creatinine ratio 14 06/09/2021 09:10 AM    GFR est AA >60 06/09/2021 09:10 AM    GFR est non-AA >60 06/09/2021 09:10 AM    Calcium 9.4 06/09/2021 09:10 AM      Wt Readings from Last 3 Encounters:   01/12/22 219 lb (99.3 kg)   11/29/21 221 lb (100.2 kg)   11/29/21 221 lb (100.2 kg)      BP Readings from Last 3 Encounters:   01/12/22 (!) 150/65   11/29/21 136/70   11/22/21 138/80      Current Outpatient Medications   Medication Sig    hydroCHLOROthiazide (HYDRODIURIL) 12.5 mg tablet Take 1 Tablet by mouth daily.  meloxicam (MOBIC) 15 mg tablet Take 1 Tablet by mouth daily as needed for Pain.  atorvastatin (LIPITOR) 40 mg tablet Take 1 tablet by mouth once daily    multivit-min/iron/folic/lutein (CENTRUM SILVER WOMEN PO)     levocetirizine (XYZAL) 5 mg tablet Take 1 Tablet by mouth daily.  montelukast (SINGULAIR) 10 mg tablet Take 1 Tablet by mouth daily.  Premarin 0.625 mg/gram vaginal cream INSERT 0.5 GRAMS VAGINALLY DAILY.  polyethylene glycol (MIRALAX) 17 gram/dose powder Take 17 g by mouth daily.  cholecalciferol (VITAMIN D3) 1,000 unit cap Take  by mouth daily.     calcium carbonate-vitamin D3 (CALTRATE 600 + D) 600 mg (1,500 mg)-800 unit chew Take  by mouth. No current facility-administered medications for this visit. Impression see above.

## 2022-02-05 DIAGNOSIS — J30.1 SEASONAL ALLERGIC RHINITIS DUE TO POLLEN: ICD-10-CM

## 2022-02-07 RX ORDER — LEVOCETIRIZINE DIHYDROCHLORIDE 5 MG/1
TABLET, FILM COATED ORAL
Qty: 30 TABLET | Refills: 0 | Status: SHIPPED | OUTPATIENT
Start: 2022-02-07 | End: 2022-02-28

## 2022-02-07 RX ORDER — MONTELUKAST SODIUM 10 MG/1
TABLET ORAL
Qty: 30 TABLET | Refills: 0 | Status: SHIPPED | OUTPATIENT
Start: 2022-02-07 | End: 2022-02-28

## 2022-02-09 ENCOUNTER — OFFICE VISIT (OUTPATIENT)
Dept: CARDIOLOGY CLINIC | Facility: CLINIC | Age: 57
End: 2022-02-09
Payer: MEDICAID

## 2022-02-09 VITALS
BODY MASS INDEX: 34.21 KG/M2 | OXYGEN SATURATION: 97 % | WEIGHT: 218 LBS | DIASTOLIC BLOOD PRESSURE: 71 MMHG | TEMPERATURE: 97.8 F | HEIGHT: 67 IN | SYSTOLIC BLOOD PRESSURE: 144 MMHG | HEART RATE: 76 BPM | RESPIRATION RATE: 16 BRPM

## 2022-02-09 DIAGNOSIS — R00.2 HEART PALPITATIONS: ICD-10-CM

## 2022-02-09 DIAGNOSIS — I10 HYPERTENSION, ESSENTIAL: Primary | ICD-10-CM

## 2022-02-09 DIAGNOSIS — E78.00 HYPERCHOLESTEREMIA: ICD-10-CM

## 2022-02-09 DIAGNOSIS — R01.1 SYSTOLIC MURMUR: ICD-10-CM

## 2022-02-09 DIAGNOSIS — I44.1 HEART BLOCK AV SECOND DEGREE: ICD-10-CM

## 2022-02-09 PROCEDURE — 99214 OFFICE O/P EST MOD 30 MIN: CPT | Performed by: SPECIALIST

## 2022-02-09 RX ORDER — HYDROCHLOROTHIAZIDE 25 MG/1
25 TABLET ORAL DAILY
Qty: 90 TABLET | Refills: 1 | Status: SHIPPED | OUTPATIENT
Start: 2022-02-09 | End: 2022-09-19

## 2022-02-09 NOTE — PROGRESS NOTES
1. Have you been to the ER, urgent care clinic since your last visit? Hospitalized since your last visit? No    2. Have you seen or consulted any other health care providers outside of the [address] [place] [address] since your last visit? Include any pap smears or colon screening.  No  Reviewed record in preparation for visit and have necessary documentation  Pt did not bring medication to office visit for review    Goals that were addressed and/or need to be completed during or after this appointment include   Health Maintenance Due   Topic Date Due    Pneumococcal 0-64 years (1 of 2 - PPSV23) Never done    DTaP/Tdap/Td series (1 - Tdap) Never done    Shingrix Vaccine Age 50> (1 of 2) Never done    Flu Vaccine (1) 09/01/2021

## 2022-02-09 NOTE — PROGRESS NOTES
Tracie Flores     1965       Vinayak Padilla MD, [facility] [place]  Date of Visit-2/9/2022   PCP is Vinod Almonte MD   [facility] and [facility] [place]  [facility]  HPI:  Tracie Flores is a 61 y.o. female   F/u   Seen last month OV at [facility] on 1/12/2022   with a history hypertension LVOT obstruction LVH. She has a history of beta-blocker induced heart block. She did not tolerate Norvasc in the past she has been off of her ACE. Previously had been seen in 2299 with a systolic murmur and echo showing no significant valve disorder. When seen 1 month ago she had palpitations a systolic murmur the echo showed a 2 m/s LVOT gradient with a normal EF and no AS   prior Loop monitor showed 2-1 heart block while on atenolol and she was contacted stop the atenolol. Her main issue on the day when seen 1 month ago such with LVH and review of testing was her hypertension. Therefore we started HCTZ at 12.5 mg.  11/29/21    ECHO ADULT COMPLETE 11/29/2021 11/29/2021    Interpretation Summary  · LV: Calculated LVEF is 76%. Biplane method used to measure ejection fraction. Normal cavity size and systolic function (ejection fraction normal). Mild concentric hypertrophy. Left ventricular diastolic dysfunction. · Mildly increased LVOT velocity= 2.0 m/s, peak gradient=16 mmHg. No significant change with valsalva. · RV: Mildly dilated right ventricle. Normal global systolic function. · LA: Moderately dilated left atrium. Left Atrium volume index is 48 mL/m2. · RA: Dilated right atrium. · TV: Right Ventricular Arterial Pressure (RVSP) is 36 mmHg. Signed by: Mame Dixon MD on 11/29/2021  5:28 PM      Assessment/Plan:    Orders Placed This Encounter    hydroCHLOROthiazide (HYDRODIURIL) 25 mg tablet   changed to 25 mg today  Fu 3 m here  One month bp check     1.  Hypertension, essential  Has LVH on echo  She needs more exercise, says she does not add salt  Very willing to try other BP meds, did not tolerate BB or amlodipine (Norvasc)    Discussed ACE or diuretic  started b HCTZ at low dose, keep diary RTC one month  She has done fairly well on the HCTZ at 12.5. She had no side effects and her blood pressure is down about 8 points. She has little bit of difficulty with her blood pressure cuff is an old one from her father and she probably needs a new one. In the meantime would like her to increase her hydrochlorothiazide to 25 mg daily I do not think this will cause her side effects. She can double up on the current pills and then return in 3 months. She will have a nurses blood pressure check in a month and then follow-up with her primary care physician. BP Readings from Last 6 Encounters:   02/09/22 (!) 144/71   01/12/22 (!) 150/65   11/29/21 136/70   11/22/21 138/80   11/03/21 138/80   10/26/21 139/78      Key CAD CHF Meds             hydroCHLOROthiazide (HYDRODIURIL) 12.5 mg tablet (Taking) Take 1 Tablet by mouth daily. atorvastatin (LIPITOR) 40 mg tablet (Taking) Take 1 tablet by mouth once daily        2. Systolic murmur  Moderate and due to LVOT , unlikely to cause current issues  Fu echo every few years, today stable without signs of heart failure    3. Heart palpitations  Improved, now off BB now almost eliminated    4. Heart block AV second degree  Due to BB will avoid beta-blocker or dihydropyridine calcium channel blocker. Interestingly with her outflow tract gradient diltiazem could be an option at a later date. Currently we will see how the diuretic works  5. XOL  On statin   At goal , denies excess muscle aches or new liver issues  Key Antihyperlipidemia Meds             atorvastatin (LIPITOR) 40 mg tablet (Taking) Take 1 tablet by mouth once daily         Lab Results   Component Value Date/Time    LDL, calculated 135.8 (H) 06/09/2021 09:10 AM                     Impression:   1. Hypertension, essential    2. Systolic murmur    3. Heart palpitations    4.  Heart block AV second degree    5. Hypercholesteremia       Cardiac History:   No specialty comments available. Future Appointments   Date Time Provider Kyung Teague   5/11/2022  2:20 PM [facility]      Patient Care Team:  Shaniqua Messina MD as PCP - General (Family Medicine)  Shaniqua Messina MD as PCP - [facility] Empaneled Provider  Amanda Antonio MD as Consulting Provider (Cardiology)      ROS-except as noted above. . A complete cardiac and respiratory are reviewed and negative except as above ; Resp-denies wheezing  or productive cough,. Const- No unusual weight loss or fever; Neuro-no recent seizure or CVA ; GI- No BRBPR, abdom pain, bloating ; - no  hematuria   see supplement sheet, initialed and to be scanned by staff  Past Medical History:   Diagnosis Date    Heart murmur     Since birth    HTN (hypertension)       Social Hx= reports that she has been smoking. She has never used smokeless tobacco. She reports current alcohol use. She reports current drug use. Drug: Marijuana. Exam and Labs:  BP (!) 144/71   Pulse 76   Temp 97.8 °F (36.6 °C)   Resp 16   Ht 5' 7\" (1.702 m)   Wt 218 lb (98.9 kg)   SpO2 97%   BMI 34.14 kg/m² Constitutional:  NAD, comfortable  Head: NC,AT. Eyes: No scleral icterus. Neck:  Neck supple. No JVD present. Throat: moist mucous membranes. Chest: Effort normal & normal respiratory excursion . Neurological: alert, conversant and oriented . Skin: Skin is not cold. No obvious systemic rash noted. Not diaphoretic. No erythema. Psychiatric:  Grossly normal mood and affect. Behavior appears normal. Extremities:  no clubbing or cyanosis. Abdomen: non distended    Lungs:breath sounds normal. No stridor. distress, wheezes or  Rales. Heart:1-2/6 short medium to low pitch systolic murmur throughout normal rate, regular rhythm, normal S1, S2, no murmurs, rubs, clicks or gallops , PMI non displaced. Edema: Edema is none.   Lab Results   Component Value Date/Time Cholesterol, total 206 (H) 06/09/2021 09:10 AM    HDL Cholesterol 57 06/09/2021 09:10 AM    LDL, calculated 135.8 (H) 06/09/2021 09:10 AM    Triglyceride 66 06/09/2021 09:10 AM    CHOL/HDL Ratio 3.6 06/09/2021 09:10 AM     Lab Results   Component Value Date/Time    Sodium 140 06/09/2021 09:10 AM    Potassium 4.7 06/09/2021 09:10 AM    Chloride 106 06/09/2021 09:10 AM    CO2 27 06/09/2021 09:10 AM    Anion gap 7 06/09/2021 09:10 AM    Glucose 87 06/09/2021 09:10 AM    BUN 12 06/09/2021 09:10 AM    Creatinine 0.85 06/09/2021 09:10 AM    BUN/Creatinine ratio 14 06/09/2021 09:10 AM    GFR est AA >60 06/09/2021 09:10 AM    GFR est non-AA >60 06/09/2021 09:10 AM    Calcium 9.4 06/09/2021 09:10 AM      Wt Readings from Last 3 Encounters:   02/09/22 218 lb (98.9 kg)   01/12/22 219 lb (99.3 kg)   11/29/21 221 lb (100.2 kg)      BP Readings from Last 3 Encounters:   02/09/22 (!) 144/71   01/12/22 (!) 150/65   11/29/21 136/70      Current Outpatient Medications   Medication Sig    montelukast (SINGULAIR) 10 mg tablet Take 1 tablet by mouth once daily    levocetirizine (XYZAL) 5 mg tablet Take 1 tablet by mouth once daily    hydroCHLOROthiazide (HYDRODIURIL) 12.5 mg tablet Take 1 Tablet by mouth daily.  meloxicam (MOBIC) 15 mg tablet Take 1 Tablet by mouth daily as needed for Pain.  atorvastatin (LIPITOR) 40 mg tablet Take 1 tablet by mouth once daily    multivit-min/iron/folic/lutein (CENTRUM SILVER WOMEN PO)     Premarin 0.625 mg/gram vaginal cream INSERT 0.5 GRAMS VAGINALLY DAILY.  polyethylene glycol (MIRALAX) 17 gram/dose powder Take 17 g by mouth daily.  cholecalciferol (VITAMIN D3) 1,000 unit cap Take  by mouth daily.  calcium carbonate-vitamin D3 (CALTRATE 600 + D) 600 mg (1,500 mg)-800 unit chew Take  by mouth. No current facility-administered medications for this visit. Impression see above.

## 2022-02-09 NOTE — Clinical Note
2/9/2022    Patient: Christiane Arguello   YOB: 1965   Date of Visit: 2/9/2022     Joseph Mercer MD  [facility]  V[address] [place]    Dear Joseph Mercer MD,      Thank you for referring Ms. Christiane Arguello to [facility] for evaluation. My notes for this consultation are attached. If you have questions, please do not hesitate to call me. I look forward to following your patient along with you.       Sincerely,    Tomás Fitzgerald MD

## 2022-02-16 ENCOUNTER — OFFICE VISIT (OUTPATIENT)
Dept: FAMILY MEDICINE CLINIC | Age: 57
End: 2022-02-16
Payer: MEDICAID

## 2022-02-16 VITALS
TEMPERATURE: 97.5 F | DIASTOLIC BLOOD PRESSURE: 73 MMHG | HEART RATE: 80 BPM | WEIGHT: 220 LBS | SYSTOLIC BLOOD PRESSURE: 126 MMHG | BODY MASS INDEX: 34.46 KG/M2 | RESPIRATION RATE: 20 BRPM | OXYGEN SATURATION: 99 %

## 2022-02-16 DIAGNOSIS — I10 PRIMARY HYPERTENSION: Primary | ICD-10-CM

## 2022-02-16 PROCEDURE — 99213 OFFICE O/P EST LOW 20 MIN: CPT | Performed by: FAMILY MEDICINE

## 2022-02-16 NOTE — PROGRESS NOTES
1. Have you been to the ER, urgent care clinic since your last visit? Hospitalized since your last visit? No    2. Have you seen or consulted any other health care providers outside of the [address] [place] [address] since your last visit? Include any pap smears or colon screening.  No  Reviewed record in preparation for visit and have necessary documentation  Pt did not bring medication to office visit for review    Goals that were addressed and/or need to be completed during or after this appointment include   Health Maintenance Due   Topic Date Due    Pneumococcal 0-64 years (1 of 2 - PPSV23) Never done    DTaP/Tdap/Td series (1 - Tdap) Never done    Shingrix Vaccine Age 50> (1 of 2) Never done    Flu Vaccine (1) 09/01/2021

## 2022-02-16 NOTE — PROGRESS NOTES
[facility]    History of Present Illness:   Boubacar Jimenez is a 61 y.o. female with history of HTN, Obesity, Arrythmia  CC: HTN follow up  History provided by patient and Records    HPI:  Hypertension Follow up: Recently had HCTZ increased to 25 mg.  Urinating more, denies dizziness or new palpitations at this time. Patient will be getting her BP cuff soon and will be able to check her BP. Currently Taking:   Key CAD CHF Meds             hydroCHLOROthiazide (HYDRODIURIL) 25 mg tablet (Taking) Take 1 Tablet by mouth daily. atorvastatin (LIPITOR) 40 mg tablet (Taking) Take 1 tablet by mouth once daily         The patient reports:  taking medications as instructed, no medication side effects noted, patient does not perform home BP monitoring, no TIA's, no chest pain on exertion, no dyspnea on exertion, no swelling of ankles, no orthostatic dizziness or lightheadedness, no orthopnea or paroxysmal nocturnal dyspnea. BP Readings from Last 3 Encounters:   02/16/22 126/73   02/09/22 (!) 144/71   01/12/22 (!) 150/65        Health Maintenance  Health Maintenance Due   Topic Date Due    Pneumococcal 0-64 years (1 of 2 - PPSV23) Never done    DTaP/Tdap/Td series (1 - Tdap) Never done    Shingrix Vaccine Age 50> (1 of 2) Never done    Flu Vaccine (1) 09/01/2021       Past Medical, Family, and Social History:     Current Outpatient Medications on File Prior to Visit   Medication Sig Dispense Refill    hydroCHLOROthiazide (HYDRODIURIL) 25 mg tablet Take 1 Tablet by mouth daily. 90 Tablet 1    montelukast (SINGULAIR) 10 mg tablet Take 1 tablet by mouth once daily 30 Tablet 0    levocetirizine (XYZAL) 5 mg tablet Take 1 tablet by mouth once daily 30 Tablet 0    meloxicam (MOBIC) 15 mg tablet Take 1 Tablet by mouth daily as needed for Pain.  30 Tablet 3    atorvastatin (LIPITOR) 40 mg tablet Take 1 tablet by mouth once daily 30 Tablet 5    multivit-min/iron/folic/lutein (CENTRUM SILVER WOMEN PO)       Premarin 0.625 mg/gram vaginal cream INSERT 0.5 GRAMS VAGINALLY DAILY. 30 g 5    polyethylene glycol (MIRALAX) 17 gram/dose powder Take 17 g by mouth daily. 289 g 3    cholecalciferol (VITAMIN D3) 1,000 unit cap Take  by mouth daily.  calcium carbonate-vitamin D3 (CALTRATE 600 + D) 600 mg (1,500 mg)-800 unit chew Take  by mouth. No current facility-administered medications on file prior to visit. Patient Active Problem List   Diagnosis Code    Severe obesity ([facility] [address] E66.01    Adult patient with history of congenital heart disease Z87.74    HTN (hypertension) I10       Social History     Socioeconomic History    Marital status: SINGLE   Tobacco Use    Smoking status: Current Every Day Smoker    Smokeless tobacco: Never Used   Substance and Sexual Activity    Alcohol use: Yes    Drug use: Yes     Types: Marijuana        Review of Systems   Review of Systems   Constitutional: Negative for chills and fever. Cardiovascular: Negative for chest pain and palpitations. Gastrointestinal: Negative for nausea and vomiting. Musculoskeletal: Negative for myalgias and neck pain. Objective:     Visit Vitals  /73   Pulse 80   Temp 97.5 °F (36.4 °C)   Resp 20   Wt 220 lb (99.8 kg)   SpO2 99%   BMI 34.46 kg/m²        Physical Exam  Vitals and nursing note reviewed. Constitutional:       Appearance: Normal appearance. HENT:      Head: Normocephalic and atraumatic. Cardiovascular:      Rate and Rhythm: Normal rate and regular rhythm. Pulses: Normal pulses. Heart sounds: No friction rub. No gallop. Pulmonary:      Effort: Pulmonary effort is normal.      Breath sounds: Normal breath sounds. Abdominal:      General: Abdomen is flat. Bowel sounds are normal.      Palpations: Abdomen is soft. Musculoskeletal:      Cervical back: Normal range of motion and neck supple. Skin:     General: Skin is warm and dry. Neurological:      Mental Status: She is alert. Pertinent Labs/Studies:      Assessment and orders:       ICD-10-CM ICD-9-CM    1. Primary hypertension  I10 401.9      Diagnoses and all orders for this visit:    1. Primary hypertension:  Significantly better controlled, continue HCTZ 25 mg. To check BP once has cuff at home. Follow-up and Dispositions    · Return in about 3 months (around 5/16/2022). I have discussed the diagnosis with the patient and the intended plan as seen in the above orders. Social history, medical history, and labs were reviewed. The patient has received an after-visit summary and questions were answered concerning future plans. I have discussed medication side effects and warnings with the patient as well.     MD ELSA Costa & ABIODUN CHEEMA [facility]  02/16/22

## 2022-02-26 DIAGNOSIS — J30.1 SEASONAL ALLERGIC RHINITIS DUE TO POLLEN: ICD-10-CM

## 2022-02-28 RX ORDER — MONTELUKAST SODIUM 10 MG/1
TABLET ORAL
Qty: 30 TABLET | Refills: 0 | Status: SHIPPED | OUTPATIENT
Start: 2022-02-28 | End: 2022-03-29

## 2022-02-28 RX ORDER — LEVOCETIRIZINE DIHYDROCHLORIDE 5 MG/1
TABLET, FILM COATED ORAL
Qty: 30 TABLET | Refills: 0 | Status: SHIPPED | OUTPATIENT
Start: 2022-02-28 | End: 2022-03-29

## 2022-03-19 PROBLEM — Z87.74 ADULT PATIENT WITH HISTORY OF CONGENITAL HEART DISEASE: Status: ACTIVE | Noted: 2019-08-23

## 2022-03-19 PROBLEM — E66.01 SEVERE OBESITY (HCC): Status: ACTIVE | Noted: 2019-04-29

## 2022-04-02 DIAGNOSIS — E78.2 MIXED HYPERLIPIDEMIA: ICD-10-CM

## 2022-04-05 RX ORDER — ATORVASTATIN CALCIUM 40 MG/1
TABLET, FILM COATED ORAL
Qty: 30 TABLET | Refills: 0 | Status: SHIPPED | OUTPATIENT
Start: 2022-04-05 | End: 2022-05-10

## 2022-04-05 NOTE — TELEPHONE ENCOUNTER
Can we get her an appt for labs? It looks like her BP medication was changed to HCTZ and we need to get labs to make sure her potassium and kidney function are stable. Thanks!

## 2022-04-19 ENCOUNTER — TELEPHONE (OUTPATIENT)
Dept: FAMILY MEDICINE CLINIC | Age: 57
End: 2022-04-19

## 2022-04-19 ENCOUNTER — NURSE TRIAGE (OUTPATIENT)
Dept: OTHER | Facility: CLINIC | Age: 57
End: 2022-04-19

## 2022-04-19 NOTE — TELEPHONE ENCOUNTER
Received call from Chao Robert at [facility] with Red Flag Complaint. Subjective: Caller states \"Past 4 days having tingling in right foot. \"     Current Symptoms: right foot/numbness and tingling intermittent, right lower back pain off and on for last 2 weeks    Onset: 2 weeks ago; gradual, intermittent    Associated Symptoms: NA    Pain Severity: 8/10; sharp and radiating; intermittent; worse with bending    Temperature: Denies      What has been tried: meloxicam not working,     LMP: NA Pregnant: Menopausal    Recommended disposition: See HCP within 4 Hours (or PCP triage)    Care advice provided, patient verbalizes understanding; denies any other questions or concerns; instructed to call back for any new or worsening symptoms. Patient states will be unable to make it for an appt today due to car trouble. Pt explained risks of waiting and based on symptoms it is recommended she be seen within 4 hours. Pt stated understanding, but unable to make appt today. Writer attempted to contact ELSA BARRON & ABIODUN CHEEMA [facility] for approval for appt for a different day, unable to get through to office. Writer provided warm transfer to Jenna Cuadra at [facility]. Attention Provider: Thank you for allowing me to participate in the care of your patient. The patient was connected to triage in response to information provided to the ECC. Please do not respond through this encounter as the response is not directed to a shared pool.     Reason for Disposition   Back pain (and neurologic deficit)    Protocols used: NEUROLOGIC DEFICIT-ADULT-AH

## 2022-04-19 NOTE — TELEPHONE ENCOUNTER
Received call from the call center stating that pt is experiencing severe back pian accompanied w/ a tingling sensation and numbness in right foot. Nurse triage advised pt to contact office because she needs to be seen within 4 hours or the next day. Pt had to hang up with call center due to having another appointment. Pt is requesting to be called on her cell phone to be scheduled. No available appts until Tuesday.

## 2022-04-22 ENCOUNTER — OFFICE VISIT (OUTPATIENT)
Dept: FAMILY MEDICINE CLINIC | Age: 57
End: 2022-04-22
Payer: MEDICAID

## 2022-04-22 VITALS
HEART RATE: 63 BPM | BODY MASS INDEX: 32.5 KG/M2 | OXYGEN SATURATION: 100 % | SYSTOLIC BLOOD PRESSURE: 140 MMHG | TEMPERATURE: 97.5 F | RESPIRATION RATE: 18 BRPM | WEIGHT: 227 LBS | HEIGHT: 70 IN | DIASTOLIC BLOOD PRESSURE: 80 MMHG

## 2022-04-22 DIAGNOSIS — M54.17 LUMBOSACRAL RADICULOPATHY AT L5: Primary | ICD-10-CM

## 2022-04-22 PROCEDURE — 99213 OFFICE O/P EST LOW 20 MIN: CPT | Performed by: STUDENT IN AN ORGANIZED HEALTH CARE EDUCATION/TRAINING PROGRAM

## 2022-04-22 NOTE — PATIENT INSTRUCTIONS
Qian Elizabeth  Physician    Provider Summary    Title Provider type   MD Physician     Primary Contact Information    Phone Fax E-mail Address   [phone] Not available [address] [facility] [address]    [facility] [address] [phone]

## 2022-04-22 NOTE — PROGRESS NOTES
1. Have you been to the ER, urgent care clinic since your last visit? Hospitalized since your last visit?  no    2. Have you seen or consulted any other health care providers outside of the [address] [place] [address] since your last visit?  no      3. For patients aged 39-70: Has the patient had a colonoscopy / FIT/ Cologuard? yes      If the patient is female:    4. For patients aged 41-77: Has the patient had a mammogram within the past 2 years? yes    5.  For patients aged 21-65: Has the patient had a pap smear? yes      Opportunity was given for questions    Goals that were addressed and/or need to be completed during or after this appointment include   Health Maintenance Due   Topic Date Due    Pneumococcal 0-64 years (1 - PCV) Never done    DTaP/Tdap/Td series (1 - Tdap) Never done    Shingrix Vaccine Age 50> (1 of 2) Never done

## 2022-04-22 NOTE — PROGRESS NOTES
Leanna Conti (: 1965) is a 61 y.o. female, established patient, here for evaluation of the following chief complaint(s):  Back Pain and Numbness (right foot x 5 days)       Assessment/Plan:  1. Lumbosacral radiculopathy at L5-symptoms and dermatomal distribution consistent with L5 radiculopathy. Given that she has had past work-up and imaging by orthopedics which I do not have access to, I recommend that she return to Ortho for further evaluation and management. States that she has already tried NSAIDs and PT. No red flag symptoms at this time requiring urgent referral or imaging.   -     REFERRAL TO ORTHOPEDICS    Return in about 2 months (around 2022), or if symptoms worsen or fail to improve. Subjective/Objective:  HPI  R foot paresthesias- Noticed over the last few weeks. Waxes and wanes throughout the day, doesn't completely go away. Potentially some foot drop. Most present on central distal plantar and dorsal foot surfaces. Some involvement of anterior shin. Denies any injury or trauma. Patient endorses history of lower abdominal herniated disks but does not have further information on this for me at this time. We have no radiology on her spine in our system. Denies saddle anesthesia, urinary or bowel incontinence. Physical Exam  Blood pressure (!) 140/80, pulse 63, temperature 97.5 °F (36.4 °C), temperature source Oral, resp. rate 18, height 5' 10\" (1.778 m), weight 227 lb (103 kg), SpO2 100 %. Body mass index is 32.57 kg/m². General appearance - Alert, NAD. Head - Atraumatic. Normocephalic. No lymphadenopathy  Eyes - EOMI. Sclera white. Ears - Hearing grossly normal.    Nose - Nares patent, no polyps  Throat - pharynx clear, no exudates. Respiratory - LCTAB. No wheeze/rale/rhonchi  Heart - Normal rate, regular rhythm. No m/r/r  Abdomen - Soft, non tender. Non distended. Neurological - No focal deficits.  Speech normal.   Musculoskeletal - Normal ROM, Gait normal. Extremities -negative straight leg raise. Strength 5/5 in hip flexion/extension and knee flexion/extension. Sensation intact in bilateral lower extremities. Bilateral monofilament foot exam 10/10. No LE edema. Skin - normal coloration and normal turgor. No cyanosis, no rash. Medical History- Reviewed Social History- Reviewed Surgical History- Reviewed   Trinidad has a past medical history of Heart murmur and HTN (hypertension). Trinidad reports that she has been smoking. She has never used smokeless tobacco. She reports current alcohol use. She reports current drug use. Drug: Marijuana. Trinidad has a past surgical history that includes hx orthopaedic. Problem List- Reviewed   Trinidad has Severe obesity ([address] Adult patient with history of congenital heart disease, and HTN (hypertension) on their problem list.     Current Outpatient Medications   Medication Instructions    atorvastatin (LIPITOR) 40 mg tablet Take 1 tablet by mouth once daily    calcium carbonate-vitamin D3 (CALTRATE 600 + D) 600 mg (1,500 mg)-800 unit chew Oral    cholecalciferol (VITAMIN D3) 1,000 unit cap Oral, DAILY    conjugated estrogens (Premarin) 0.625 mg/gram vaginal cream Insert 1/2(one-half) gram into the vaginal 1-3 times per week as needed for vaginal dryness.  hydroCHLOROthiazide (HYDRODIURIL) 25 mg, Oral, DAILY    levocetirizine (XYZAL) 5 mg tablet Take 1 tablet by mouth once daily    meloxicam (MOBIC) 15 mg, Oral, DAILY AS NEEDED    montelukast (SINGULAIR) 10 mg tablet Take 1 tablet by mouth once daily    multivit-min/iron/folic/lutein (CENTRUM SILVER WOMEN PO) No dose, route, or frequency recorded.  polyethylene glycol (MIRALAX) 17 g, Oral, DAILY         An electronic signature was used to authenticate this note.   -- Jacob Mcfadden MD

## 2022-05-08 DIAGNOSIS — E78.2 MIXED HYPERLIPIDEMIA: ICD-10-CM

## 2022-05-10 RX ORDER — ATORVASTATIN CALCIUM 40 MG/1
TABLET, FILM COATED ORAL
Qty: 30 TABLET | Refills: 0 | Status: SHIPPED | OUTPATIENT
Start: 2022-05-10 | End: 2022-06-07

## 2022-05-11 ENCOUNTER — OFFICE VISIT (OUTPATIENT)
Dept: CARDIOLOGY CLINIC | Facility: CLINIC | Age: 57
End: 2022-05-11
Payer: MEDICAID

## 2022-05-11 VITALS
HEIGHT: 70 IN | TEMPERATURE: 99.1 F | SYSTOLIC BLOOD PRESSURE: 145 MMHG | RESPIRATION RATE: 18 BRPM | OXYGEN SATURATION: 98 % | BODY MASS INDEX: 32.21 KG/M2 | HEART RATE: 74 BPM | DIASTOLIC BLOOD PRESSURE: 78 MMHG | WEIGHT: 225 LBS

## 2022-05-11 DIAGNOSIS — R00.2 HEART PALPITATIONS: ICD-10-CM

## 2022-05-11 DIAGNOSIS — I44.1 HEART BLOCK AV SECOND DEGREE: ICD-10-CM

## 2022-05-11 DIAGNOSIS — I10 HYPERTENSION, ESSENTIAL: Primary | ICD-10-CM

## 2022-05-11 DIAGNOSIS — E78.00 HYPERCHOLESTEREMIA: ICD-10-CM

## 2022-05-11 DIAGNOSIS — R01.1 SYSTOLIC MURMUR: ICD-10-CM

## 2022-05-11 PROCEDURE — 99213 OFFICE O/P EST LOW 20 MIN: CPT | Performed by: SPECIALIST

## 2022-05-11 RX ORDER — LISINOPRIL 5 MG/1
5 TABLET ORAL DAILY
Qty: 90 TABLET | Refills: 3 | Status: SHIPPED | OUTPATIENT
Start: 2022-05-11

## 2022-05-11 NOTE — Clinical Note
5/11/2022    Patient: Rober Bradley   YOB: 1965   Date of Visit: 5/11/2022     Polly Palacios MD  [facility] [facility]  [place] [place]    Dear Polly Palacios MD,      Thank you for referring Ms. Rober Bradley to [facility] for evaluation. My notes for this consultation are attached. If you have questions, please do not hesitate to call me. I look forward to following your patient along with you.       Sincerely,    Priscila Preciado MD

## 2022-05-11 NOTE — PROGRESS NOTES
1. \"Have you been to the ER, urgent care clinic since your last visit? Hospitalized since your last visit? \" No    2. \"Have you seen or consulted any other health care providers outside of the [address] [place] [address] since your last visit? \" No         Health Maintenance Due   Topic Date Due    Pneumococcal 0-64 years (1 - PCV) Never done    DTaP/Tdap/Td series (1 - Tdap) Never done    Shingrix Vaccine Age 50> (1 of 2) Never done

## 2022-05-11 NOTE — PATIENT INSTRUCTIONS
Get a sleep study done - call [phone]  Add lisinopril 5 mg daily in addition to the HCT  Follow up with PCP in a month and then continue for bp control  We will see in 6 months

## 2022-05-11 NOTE — PROGRESS NOTES
Caitlyn Hills     1965       Vinayak Jhaveri MD, [facility]  Date of Visit-5/11/2022   PCP is Emily Gomez MD   [facility]  [facility]  HPI:  Caitlyn Hills is a 61 y.o. female   F/u  from 2/2022     with a history hypertension LVOT obstruction LVH. She has a history of beta-blocker induced heart block. She did not tolerate Norvasc in the past she has been off of her ACE. Previously had been seen in 3684 with a systolic murmur and echo showing no significant valve disorder. When seen 4 month ago she had palpitations a systolic murmur the echo showed a 2 m/s LVOT gradient with a normal EF and no AS   prior Loop monitor showed 2-1 heart block while on atenolol and she was contacted stop the atenolol. Her main issue was her hypertension. Therefore we started HCTZ at 12.5 mg. Today. . Had back pain and numbness in feet, seeing Ortho next week  Still blood pressure higher  No cp  Has some SOB and growl at bedtime  Under stress due to mothers dementia  Still smoking,  with her  Assessment/Plan:    Patient Instructions   Get a sleep study done - call [phone]  Add lisinopril 5 mg daily in addition to the HCT  Follow up with PCP in a month and then continue for bp control  We will see in 6 months     Get echo just before next visit    1. Hypertension, essential  Has LVH on echo  She needs more exercise, says she does not add salt  Very willing to try other BP meds, did not tolerate BB or amlodipine (Norvasc)    BP high so add ACE to CCB    BP Readings from Last 6 Encounters:   05/11/22 (!) 145/78   04/22/22 (!) 140/80   02/16/22 126/73   02/09/22 (!) 144/71   01/12/22 (!) 150/65   11/29/21 136/70      Key CAD CHF Meds             lisinopriL (PRINIVIL, ZESTRIL) 5 mg tablet (Taking) Take 1 Tablet by mouth daily.     atorvastatin (LIPITOR) 40 mg tablet (Taking) Take 1 tablet by mouth once daily    hydroCHLOROthiazide (HYDRODIURIL) 25 mg tablet (Taking) Take 1 Tablet by mouth daily. 2. Systolic murmur  Moderate and due to LVOT , unlikely to cause current issues  Fu echo every few years,  stable without signs of heart failure  11/29/21    ECHO ADULT COMPLETE 11/29/2021 11/29/2021    Interpretation Summary  · LV: Calculated LVEF is 76%. Biplane method used to measure ejection fraction. Normal cavity size and systolic function (ejection fraction normal). Mild concentric hypertrophy. Left ventricular diastolic dysfunction. · Mildly increased LVOT velocity= 2.0 m/s, peak gradient=16 mmHg. No significant change with valsalva. · RV: Mildly dilated right ventricle. Normal global systolic function. · LA: Moderately dilated left atrium. Left Atrium volume index is 48 mL/m2. · RA: Dilated right atrium. · TV: Right Ventricular Arterial Pressure (RVSP) is 36 mmHg. Signed by: Laxmi Loo MD on 11/29/2021  5:28 PM      3. Heart palpitations  Improved, now off BB now almost eliminated    4. Heart block AV second degree  Due to BB will avoid beta-blocker or dihydropyridine calcium channel blocker. Interestingly with her outflow tract gradient diltiazem could be an option at a later date. Currently we will see how the diuretic works  5. XOL  On statin   At goal , denies excess muscle aches or new liver issues  Key Antihyperlipidemia Meds             atorvastatin (LIPITOR) 40 mg tablet (Taking) Take 1 tablet by mouth once daily         Lab Results   Component Value Date/Time    LDL, calculated 135.8 (H) 06/09/2021 09:10 AM                     Impression:   1. Hypertension, essential    2. Systolic murmur    3. Heart palpitations    4. Heart block AV second degree    5. Hypercholesteremia       Cardiac History:   11/29/21    ECHO ADULT COMPLETE 11/29/2021 11/29/2021    Interpretation Summary  · LV: Calculated LVEF is 76%. Biplane method used to measure ejection fraction. Normal cavity size and systolic function (ejection fraction normal).  Mild concentric hypertrophy. Left ventricular diastolic dysfunction. · Mildly increased LVOT velocity= 2.0 m/s, peak gradient=16 mmHg. No significant change with valsalva. · RV: Mildly dilated right ventricle. Normal global systolic function. · LA: Moderately dilated left atrium. Left Atrium volume index is 48 mL/m2. · RA: Dilated right atrium. · TV: Right Ventricular Arterial Pressure (RVSP) is 36 mmHg. Signed by: Sonia Mtz MD on 11/29/2021  5:28 PM      No specialty comments available. Future Appointments   Date Time Provider Kyung Teague   5/16/2022  2:40 PM Jyoti Oakley MD [facility] BS AMB      Patient Care Team:  Jyoti Oakley MD as PCP - General (Family Medicine)  Jyoti Oakley MD as PCP - [facility]  Sonia Mtz MD as Consulting Provider (Cardiovascular Disease Physician)      ROS-except as noted above. . A complete cardiac and respiratory are reviewed and negative except as above ; Resp-denies wheezing  or productive cough,. Const- No unusual weight loss or fever; Neuro-no recent seizure or CVA ; GI- No BRBPR, abdom pain, bloating ; - no  hematuria   see supplement sheet, initialed and to be scanned by staff  Past Medical History:   Diagnosis Date    Heart murmur     Since birth    HTN (hypertension)       Social Hx= reports that she has been smoking. She has never used smokeless tobacco. She reports current alcohol use. She reports current drug use. Drug: Marijuana. Exam and Labs:  BP (!) 145/78 (BP 1 Location: Left upper arm, BP Patient Position: Sitting, BP Cuff Size: Adult)   Pulse 74   Temp 99.1 °F (37.3 °C)   Resp 18   Ht 5' 10\" (1.778 m)   Wt 225 lb (102.1 kg)   SpO2 98%   BMI 32.28 kg/m² Constitutional:  NAD, comfortable  Head: NC,AT. Eyes: No scleral icterus. Neck:  Neck supple. No JVD present. Throat: moist mucous membranes. Chest: Effort normal & normal respiratory excursion . Neurological: alert, conversant and oriented .  Skin: Skin is not cold. No obvious systemic rash noted. Not diaphoretic. No erythema. Psychiatric:  Grossly normal mood and affect. Behavior appears normal. Extremities:  no clubbing or cyanosis. Abdomen: non distended    Lungs:breath sounds normal. No stridor. distress, wheezes or  Rales. Heart:1-2/6 short medium to low pitch systolic murmur throughout normal rate, regular rhythm, normal S1, S2, no murmurs, rubs, clicks or gallops , PMI non displaced. Edema: Edema is none. Lab Results   Component Value Date/Time    Cholesterol, total 206 (H) 06/09/2021 09:10 AM    HDL Cholesterol 57 06/09/2021 09:10 AM    LDL, calculated 135.8 (H) 06/09/2021 09:10 AM    Triglyceride 66 06/09/2021 09:10 AM    CHOL/HDL Ratio 3.6 06/09/2021 09:10 AM     Lab Results   Component Value Date/Time    Sodium 140 06/09/2021 09:10 AM    Potassium 4.7 06/09/2021 09:10 AM    Chloride 106 06/09/2021 09:10 AM    CO2 27 06/09/2021 09:10 AM    Anion gap 7 06/09/2021 09:10 AM    Glucose 87 06/09/2021 09:10 AM    BUN 12 06/09/2021 09:10 AM    Creatinine 0.85 06/09/2021 09:10 AM    BUN/Creatinine ratio 14 06/09/2021 09:10 AM    GFR est AA >60 06/09/2021 09:10 AM    GFR est non-AA >60 06/09/2021 09:10 AM    Calcium 9.4 06/09/2021 09:10 AM      Wt Readings from Last 3 Encounters:   05/11/22 225 lb (102.1 kg)   04/22/22 227 lb (103 kg)   02/16/22 220 lb (99.8 kg)      BP Readings from Last 3 Encounters:   05/11/22 (!) 145/78   04/22/22 (!) 140/80   02/16/22 126/73      Current Outpatient Medications   Medication Sig    atorvastatin (LIPITOR) 40 mg tablet Take 1 tablet by mouth once daily    conjugated estrogens (Premarin) 0.625 mg/gram vaginal cream Insert 1/2(one-half) gram into the vaginal 1-3 times per week as needed for vaginal dryness.     montelukast (SINGULAIR) 10 mg tablet Take 1 tablet by mouth once daily    levocetirizine (XYZAL) 5 mg tablet Take 1 tablet by mouth once daily    hydroCHLOROthiazide (HYDRODIURIL) 25 mg tablet Take 1 Tablet by mouth daily.  meloxicam (MOBIC) 15 mg tablet Take 1 Tablet by mouth daily as needed for Pain.  multivit-min/iron/folic/lutein (CENTRUM SILVER WOMEN PO)     polyethylene glycol (MIRALAX) 17 gram/dose powder Take 17 g by mouth daily.  cholecalciferol (VITAMIN D3) 1,000 unit cap Take  by mouth daily.  calcium carbonate-vitamin D3 (CALTRATE 600 + D) 600 mg (1,500 mg)-800 unit chew Take  by mouth. No current facility-administered medications for this visit. Impression see above.

## 2022-05-13 ENCOUNTER — PATIENT MESSAGE (OUTPATIENT)
Dept: SLEEP MEDICINE | Age: 57
End: 2022-05-13

## 2022-05-13 ENCOUNTER — TELEPHONE (OUTPATIENT)
Dept: SLEEP MEDICINE | Age: 57
End: 2022-05-13

## 2022-05-13 NOTE — TELEPHONE ENCOUNTER
Patient phoned the office returning our call to schedule a sleep consult. She stated that she will call after she sees her [facility] provider.

## 2022-05-16 ENCOUNTER — VIRTUAL VISIT (OUTPATIENT)
Dept: FAMILY MEDICINE CLINIC | Age: 57
End: 2022-05-16

## 2022-05-23 ENCOUNTER — TRANSCRIBE ORDER (OUTPATIENT)
Dept: SCHEDULING | Age: 57
End: 2022-05-23

## 2022-05-23 DIAGNOSIS — M54.16 LUMBAR RADICULITIS: Primary | ICD-10-CM

## 2022-06-06 DIAGNOSIS — E78.2 MIXED HYPERLIPIDEMIA: ICD-10-CM

## 2022-06-07 RX ORDER — ATORVASTATIN CALCIUM 40 MG/1
TABLET, FILM COATED ORAL
Qty: 30 TABLET | Refills: 5 | Status: SHIPPED | OUTPATIENT
Start: 2022-06-07

## 2022-06-12 ENCOUNTER — HOSPITAL ENCOUNTER (OUTPATIENT)
Dept: MRI IMAGING | Age: 57
Discharge: HOME | End: 2022-06-12
Attending: ORTHOPAEDIC SURGERY
Payer: MEDICAID

## 2022-06-12 DIAGNOSIS — M54.16 LUMBAR RADICULITIS: ICD-10-CM

## 2022-06-12 PROCEDURE — 72148 MRI LUMBAR SPINE W/O DYE: CPT

## 2022-07-20 ENCOUNTER — TRANSCRIBE ORDER (OUTPATIENT)
Dept: SCHEDULING | Age: 57
End: 2022-07-20

## 2022-07-20 DIAGNOSIS — Z12.31 VISIT FOR SCREENING MAMMOGRAM: Primary | ICD-10-CM

## 2022-08-04 ENCOUNTER — OFFICE VISIT (OUTPATIENT)
Dept: FAMILY MEDICINE CLINIC | Age: 57
End: 2022-08-04
Payer: COMMERCIAL

## 2022-08-04 VITALS
WEIGHT: 225 LBS | SYSTOLIC BLOOD PRESSURE: 117 MMHG | OXYGEN SATURATION: 98 % | BODY MASS INDEX: 32.21 KG/M2 | DIASTOLIC BLOOD PRESSURE: 78 MMHG | HEIGHT: 70 IN | HEART RATE: 69 BPM | RESPIRATION RATE: 16 BRPM | TEMPERATURE: 98.5 F

## 2022-08-04 DIAGNOSIS — R23.2 HOT FLASHES: ICD-10-CM

## 2022-08-04 DIAGNOSIS — E78.00 HYPERCHOLESTEREMIA: ICD-10-CM

## 2022-08-04 DIAGNOSIS — I10 PRIMARY HYPERTENSION: Primary | ICD-10-CM

## 2022-08-04 DIAGNOSIS — H61.21 IMPACTED CERUMEN OF RIGHT EAR: ICD-10-CM

## 2022-08-04 PROCEDURE — 69209 REMOVE IMPACTED EAR WAX UNI: CPT | Performed by: FAMILY MEDICINE

## 2022-08-04 PROCEDURE — 99214 OFFICE O/P EST MOD 30 MIN: CPT | Performed by: FAMILY MEDICINE

## 2022-08-04 RX ORDER — GABAPENTIN 100 MG/1
100 CAPSULE ORAL 2 TIMES DAILY
COMMUNITY
Start: 2022-07-01

## 2022-08-04 NOTE — LETTER
8/4/2022 1:38 PM    To Medical Records    Please fax us the most recent office visit notes, any diagnostic test results so that we may update the patient's records for continuity of care.      Our fax number: [phone]    Patient:   Melina MuroOconto  1965      Sincerely,      Shakila Ybarra MD

## 2022-08-04 NOTE — PROGRESS NOTES
Chief Complaint   Patient presents with    Follow Up Chronic Condition     Libido, hot flashes, irritability     1. \"Have you been to the ER, urgent care clinic since your last visit? Hospitalized since your last visit? \" No    2. \"Have you seen or consulted any other health care providers outside of the [address] [place] [address] since your last visit? \"  Yes, ortho for back pain. 3. For patients aged 39-70: Has the patient had a colonoscopy / FIT/ Cologuard? No      If the patient is female:    4. For patients aged 41-77: Has the patient had a mammogram within the past 2 years? Yes - no Care Gap present      5. For patients aged 21-65: Has the patient had a pap smear?  Yes - no Care Gap present    Health Maintenance Due   Topic Date Due    Pneumococcal 0-64 years (1 - PCV) Never done    DTaP/Tdap/Td series (1 - Tdap) Never done    Shingrix Vaccine Age 50> (1 of 2) Never done    Lipid Screen  06/09/2022    Colorectal Cancer Screening Combo  08/18/2022

## 2022-08-04 NOTE — PROGRESS NOTES
CC: follow-up HTN    HPI: Pt is a 61 y.o. female who presents for follow-up HTN. She also has noticed decreased hearing for the past few months. She has tried cleaning out her ears and flushing them but hasn't noticed much change. It seems to be worse in one ear than the other but she isn't sure which ear. She has been having a lot of hot flashes and decreased libido which are very stressful for her and her partner. She is interested in discussing risks and benefits of oral HRT with a Gynecologist.     HTN:  Checking BPs at home?: YES  Logs today?: NO  Range of BPs?: Unsure of exact numbers but has been looking good  Headaches?: NO  Blurry vision?: NO  Lower extremity edema?: NO  Smoking?: NO      Past Medical History:   Diagnosis Date    Heart murmur     Since birth    HTN (hypertension)        History reviewed. No pertinent family history. Social History     Tobacco Use    Smoking status: Every Day     Packs/day: 0.25     Years: 20.00     Pack years: 5.00     Types: Cigarettes    Smokeless tobacco: Never   Substance Use Topics    Alcohol use: Yes    Drug use: Yes     Types: Marijuana       ROS:  Per HPI    PE:  Visit Vitals  /78 (BP 1 Location: Right arm, BP Patient Position: Sitting, BP Cuff Size: Large adult)   Pulse 69   Temp 98.5 °F (36.9 °C) (Oral)   Resp 16   Ht 5' 10\" (1.778 m)   Wt 225 lb (102.1 kg)   SpO2 98%   BMI 32.28 kg/m²     Gen: Pt sitting in chair, in NAD  Head: Normocephalic, atraumatic  Eyes: Sclera anicteric, EOM grossly intact, PERRL  Ears: R TM initially obscured by cerumen but clears with irrigation.  TM's pearly with good light reflex b/l  Nose: Boggy nasal mucosa +clear drainage  Throat: MMM, normal lips, tongue, teeth and gums  Neck: Supple, no LAD, no thyromegaly or carotid bruits  CVS: Normal S1, S2, + 3/6 systolic murmur  Resp: CTAB, no wheezes or rales  Extrem: Atraumatic, no cyanosis or edema  Pulses: 2+   Skin: Warm, dry  Neuro: Alert, oriented, appropriate      A/P: Encounter Diagnoses     ICD-10-CM ICD-9-CM   1. Primary hypertension  I10 401.9   2. Hypercholesteremia  E78.00 272.0   3. Hot flashes  R23.2 782.62   4. Impacted cerumen of right ear  H61.21 380.4     1. Primary hypertension: Stable, continue current medications.  - METABOLIC PANEL, COMPREHENSIVE; Future  - METABOLIC PANEL, COMPREHENSIVE    2. Hypercholesteremia  - LIPID PANEL; Future  - LIPID PANEL    3. Hot flashes: At her age, HRT would be reasonable to discuss risks and benefits, however she will need to stop smoking first.   - REFERRAL TO GYNECOLOGY    4. Impacted cerumen of right ear: Pt unable to tell if there was much difference in hearing after irrigation 2/2 sensation of water in ear. She will let us know in a week or so if she is still having problems and at that time we can refer to Audiology.   - REMOVAL IMPACTED CERUMEN IRRIGATION/LVG UNILAT       RTC in 6 months for f/u chronic conditions, or sooner prn    Discussed diagnoses in detail with patient. Medication risks/benefits/side effects discussed with patient. All of the patient's questions were addressed. The patient understands and agrees with our plan of care. The patient knows to call back if they are unsure of or forget any changes we discussed today or if the symptoms change. The patient received an After-Visit Summary which contains VS, orders, medication list and allergy list. This can be used as a \"mini-medical record\" should they have to seek medical care while out of town. Current Outpatient Medications on File Prior to Visit   Medication Sig Dispense Refill    gabapentin (NEURONTIN) 100 mg capsule Take 100 mg by mouth two (2) times a day. atorvastatin (LIPITOR) 40 mg tablet Take 1 tablet by mouth once daily 30 Tablet 5    lisinopriL (PRINIVIL, ZESTRIL) 5 mg tablet Take 1 Tablet by mouth daily.  90 Tablet 3    conjugated estrogens (Premarin) 0.625 mg/gram vaginal cream Insert 1/2(one-half) gram into the vaginal 1-3 times per week as needed for vaginal dryness. 30 g 2    montelukast (SINGULAIR) 10 mg tablet Take 1 tablet by mouth once daily 30 Tablet 5    levocetirizine (XYZAL) 5 mg tablet Take 1 tablet by mouth once daily 30 Tablet 5    hydroCHLOROthiazide (HYDRODIURIL) 25 mg tablet Take 1 Tablet by mouth daily. 90 Tablet 1    multivit-min/iron/folic/lutein (CENTRUM SILVER WOMEN PO)       polyethylene glycol (MIRALAX) 17 gram/dose powder Take 17 g by mouth daily. 289 g 3    cholecalciferol (VITAMIN D3) 25 mcg (1,000 unit) cap Take  by mouth daily. calcium carbonate-vitamin D3 600 mg-20 mcg (800 unit) chew Take  by mouth.      meloxicam (MOBIC) 15 mg tablet Take 1 Tablet by mouth daily as needed for Pain. 30 Tablet 3     No current facility-administered medications on file prior to visit.

## 2022-08-05 LAB
ALBUMIN SERPL-MCNC: 3.6 G/DL (ref 3.5–5)
ALBUMIN/GLOB SERPL: 1.2 {RATIO} (ref 1.1–2.2)
ALP SERPL-CCNC: 78 U/L (ref 45–117)
ALT SERPL-CCNC: 26 U/L (ref 12–78)
ANION GAP SERPL CALC-SCNC: 7 MMOL/L (ref 5–15)
AST SERPL-CCNC: 15 U/L (ref 15–37)
BILIRUB SERPL-MCNC: 0.3 MG/DL (ref 0.2–1)
BUN SERPL-MCNC: 13 MG/DL (ref 6–20)
BUN/CREAT SERPL: 16 (ref 12–20)
CALCIUM SERPL-MCNC: 9.4 MG/DL (ref 8.5–10.1)
CHLORIDE SERPL-SCNC: 104 MMOL/L (ref 97–108)
CHOLEST SERPL-MCNC: 135 MG/DL
CO2 SERPL-SCNC: 28 MMOL/L (ref 21–32)
CREAT SERPL-MCNC: 0.82 MG/DL (ref 0.55–1.02)
GLOBULIN SER CALC-MCNC: 3 G/DL (ref 2–4)
GLUCOSE SERPL-MCNC: 82 MG/DL (ref 65–100)
HDLC SERPL-MCNC: 51 MG/DL
HDLC SERPL: 2.6 {RATIO} (ref 0–5)
LDLC SERPL CALC-MCNC: 68.4 MG/DL (ref 0–100)
POTASSIUM SERPL-SCNC: 4.1 MMOL/L (ref 3.5–5.1)
PROT SERPL-MCNC: 6.6 G/DL (ref 6.4–8.2)
SODIUM SERPL-SCNC: 139 MMOL/L (ref 136–145)
TRIGL SERPL-MCNC: 78 MG/DL (ref ?–150)
VLDLC SERPL CALC-MCNC: 15.6 MG/DL

## 2022-08-30 ENCOUNTER — HOSPITAL ENCOUNTER (OUTPATIENT)
Dept: MAMMOGRAPHY | Age: 57
Discharge: HOME | End: 2022-08-30
Attending: FAMILY MEDICINE
Payer: COMMERCIAL

## 2022-08-30 DIAGNOSIS — Z12.31 VISIT FOR SCREENING MAMMOGRAM: ICD-10-CM

## 2022-08-30 PROCEDURE — 77063 BREAST TOMOSYNTHESIS BI: CPT

## 2022-09-16 DIAGNOSIS — J30.1 SEASONAL ALLERGIC RHINITIS DUE TO POLLEN: ICD-10-CM

## 2022-09-16 DIAGNOSIS — I10 HYPERTENSION, ESSENTIAL: ICD-10-CM

## 2022-09-16 RX ORDER — LEVOCETIRIZINE DIHYDROCHLORIDE 5 MG/1
TABLET, FILM COATED ORAL
Qty: 30 TABLET | Refills: 5 | Status: SHIPPED | OUTPATIENT
Start: 2022-09-16

## 2022-09-16 RX ORDER — MONTELUKAST SODIUM 10 MG/1
TABLET ORAL
Qty: 30 TABLET | Refills: 5 | Status: SHIPPED | OUTPATIENT
Start: 2022-09-16

## 2022-09-19 RX ORDER — HYDROCHLOROTHIAZIDE 25 MG/1
25 TABLET ORAL DAILY
Qty: 90 TABLET | Refills: 3 | Status: SHIPPED | OUTPATIENT
Start: 2022-09-19

## 2022-11-09 ENCOUNTER — OFFICE VISIT (OUTPATIENT)
Dept: CARDIOLOGY CLINIC | Facility: CLINIC | Age: 57
End: 2022-11-09
Payer: COMMERCIAL

## 2022-11-09 VITALS
RESPIRATION RATE: 20 BRPM | DIASTOLIC BLOOD PRESSURE: 70 MMHG | OXYGEN SATURATION: 100 % | TEMPERATURE: 97.8 F | WEIGHT: 230 LBS | SYSTOLIC BLOOD PRESSURE: 112 MMHG | HEIGHT: 70 IN | BODY MASS INDEX: 32.93 KG/M2 | HEART RATE: 64 BPM

## 2022-11-09 DIAGNOSIS — I44.1 HEART BLOCK AV SECOND DEGREE: ICD-10-CM

## 2022-11-09 DIAGNOSIS — E78.00 HYPERCHOLESTEREMIA: ICD-10-CM

## 2022-11-09 DIAGNOSIS — R00.2 HEART PALPITATIONS: ICD-10-CM

## 2022-11-09 DIAGNOSIS — R01.1 SYSTOLIC MURMUR: ICD-10-CM

## 2022-11-09 DIAGNOSIS — I10 HYPERTENSION, ESSENTIAL: Primary | ICD-10-CM

## 2022-11-09 PROCEDURE — 3074F SYST BP LT 130 MM HG: CPT | Performed by: SPECIALIST

## 2022-11-09 PROCEDURE — 99214 OFFICE O/P EST MOD 30 MIN: CPT | Performed by: SPECIALIST

## 2022-11-09 PROCEDURE — 3078F DIAST BP <80 MM HG: CPT | Performed by: SPECIALIST

## 2022-11-09 NOTE — PROGRESS NOTES
1. Have you been to the ER, urgent care clinic since your last visit? Hospitalized since your last visit? No    2. Have you seen or consulted any other health care providers outside of the [address] [place] [address] since your last visit? Include any pap smears or colon screening.  No  Reviewed record in preparation for visit and have necessary documentation  Pt did not bring medication to office visit for review  opportunity was given for questions            Goals that were addressed and/or need to be completed during or after this appointment include   Health Maintenance Due   Topic Date Due    Pneumococcal 0-64 years (1 - PCV) Never done    DTaP/Tdap/Td series (1 - Tdap) Never done    Shingrix Vaccine Age 50> (1 of 2) Never done    COVID-19 Vaccine (5 - Booster) 05/28/2022    Colorectal Cancer Screening Combo  08/18/2022

## 2022-11-09 NOTE — LETTER
11/9/2022    Patient: [place] D[place]way   YOB: 1965   Date of Visit: 11/9/2022     Andres Ortiz MD  [address]  [place]    Dear Andres Ortiz MD,    Some palps-due to stress etc  Stable after adding ACE to HCT  Echo showed LVOT obstruction mild  Repeat echo one year  No changes in meds    Sincerely,    Diana Suh MD

## 2022-11-09 NOTE — PATIENT INSTRUCTIONS
We will see you back for an annual office visit and echo at [address] [place] [address].

## 2022-11-09 NOTE — PROGRESS NOTES
Sid Mirza     1965       Vinayak Zamudio MD, [facility]  Date of Visit-11/9/2022   PCP is Theron Velez MD   [facility] [place]  [facility]  HPI:  Sid Mirza is a 61 y.o. female   Today   Had some heart palpitations    Mild GREGORY and having back issues  No edema or chest pain  Did well with ACE added last visit  Under stress due to mothers dementia   with her    Hx  with a history hypertension LVOT obstruction LVH. She has a history of beta-blocker induced heart block. She did not tolerate Norvasc   2184 with a systolic murmur and echo showing no significant valve disorder. When seen 4 month ago she had palpitations a systolic murmur the echo showed a 2 m/s LVOT gradient with a normal EF and no AS   prior Loop monitor showed 2-1 heart block while on atenolol and she was contacted stop the atenolol. Her main issue was her hypertension. Therefore we started HCTZ at 12.5 mg.  Still smoking  Assessment/Plan:    Patient Instructions   We will call you to set up a visit and echo in one year at [facility] office  t    1. Hypertension, essential  Has LVH on echo    did not tolerate BB or amlodipine (Norvasc)    BP high so add ed ACE to CCB    BP Readings from Last 6 Encounters:   11/09/22 112/70   08/04/22 117/78   05/11/22 (!) 145/78   04/22/22 (!) 140/80   02/16/22 126/73   02/09/22 (!) 144/71      Key CAD CHF Meds               hydroCHLOROthiazide (HYDRODIURIL) 25 mg tablet (Taking) Take 1 Tablet by mouth daily. atorvastatin (LIPITOR) 40 mg tablet (Taking) Take 1 tablet by mouth once daily    lisinopriL (PRINIVIL, ZESTRIL) 5 mg tablet (Taking) Take 1 Tablet by mouth daily. 2. Systolic murmur  Moderate and due to LVOT , unlikely to cause current issues  Fu echo every few years,  stable without signs of heart failure  11/29/21    ECHO ADULT COMPLETE 11/29/2021 11/29/2021    Interpretation Summary  · LV: Calculated LVEF is 76%.  Biplane method used to measure ejection fraction. Normal cavity size and systolic function (ejection fraction normal). Mild concentric hypertrophy. Left ventricular diastolic dysfunction. · Mildly increased LVOT velocity= 2.0 m/s, peak gradient=16 mmHg. No significant change with valsalva. · RV: Mildly dilated right ventricle. Normal global systolic function. · LA: Moderately dilated left atrium. Left Atrium volume index is 48 mL/m2. · RA: Dilated right atrium. · TV: Right Ventricular Arterial Pressure (RVSP) is 36 mmHg. Signed by: Cielo Morales MD on 11/29/2021  5:28 PM      3. Heart palpitations  Improved, now off BB now almost eliminated  Comes back with stress now    4. Heart block AV second degree  Due to BB will avoid beta-blocker or dihydropyridine calcium channel blocker. Interestingly with her outflow tract gradient diltiazem could be an option at a later date. Currently we will see how the diuretic works    5. XOL  On statin   At goal , denies excess muscle aches or new liver issues  Key Antihyperlipidemia Meds               atorvastatin (LIPITOR) 40 mg tablet (Taking) Take 1 tablet by mouth once daily           Lab Results   Component Value Date/Time    LDL, calculated 68.4 08/04/2022 02:50 PM            Impression:   1. Hypertension, essential    2. Systolic murmur    3. Heart palpitations    4. Heart block AV second degree    5. Hypercholesteremia         Cardiac History:   11/29/21    ECHO ADULT COMPLETE 11/29/2021 11/29/2021    Interpretation Summary  · LV: Calculated LVEF is 76%. Biplane method used to measure ejection fraction. Normal cavity size and systolic function (ejection fraction normal). Mild concentric hypertrophy. Left ventricular diastolic dysfunction. · Mildly increased LVOT velocity= 2.0 m/s, peak gradient=16 mmHg. No significant change with valsalva. · RV: Mildly dilated right ventricle. Normal global systolic function. · LA: Moderately dilated left atrium.  Left Atrium volume index is 48 mL/m2. · RA: Dilated right atrium. · TV: Right Ventricular Arterial Pressure (RVSP) is 36 mmHg. Signed by: Ashly Pablo MD on 11/29/2021  5:28 PM      No specialty comments available. Future Appointments   Date Time Provider Kyung Teague   2/6/2023  9:20 AM Chito Rivera MD [facility] BS AMB      Patient Care Team:  Chito Rivera MD as PCP - General (Family Medicine)  Chito Rivera MD as PCP - [address] [place] [address] Provider  Ashly Pablo MD as Consulting Provider (Cardiovascular Disease Physician)      ROS-except as noted above. . A complete cardiac and respiratory are reviewed and negative except as above ; Resp-denies wheezing  or productive cough,. Const- No unusual weight loss or fever; Neuro-no recent seizure or CVA ; GI- No BRBPR, abdom pain, bloating ; - no  hematuria   see supplement sheet, initialed and to be scanned by staff  Past Medical History:   Diagnosis Date    Heart murmur     Since birth    HTN (hypertension)       Social Hx= reports that she has been smoking cigarettes. She has a 5.00 pack-year smoking history. She has never used smokeless tobacco. She reports current alcohol use. She reports current drug use. Drug: Marijuana. Exam and Labs:  /70   Pulse 64   Temp 97.8 °F (36.6 °C)   Resp 20   Ht 5' 10\" (1.778 m)   Wt 230 lb (104.3 kg)   SpO2 100%   BMI 33.00 kg/m² Constitutional:  NAD, comfortable  Head: NC,AT. Eyes: No scleral icterus. Neck:  Neck supple. No JVD present. Throat: moist mucous membranes. Chest: Effort normal & normal respiratory excursion . Neurological: alert, conversant and oriented . Skin: Skin is not cold. No obvious systemic rash noted. Not diaphoretic. No erythema. Psychiatric:  Grossly normal mood and affect. Behavior appears normal. Extremities:  no clubbing or cyanosis. Abdomen: non distended    Lungs:breath sounds normal. No stridor. distress, wheezes or  Rales.   Heart:1-2/6 short medium to low pitch systolic murmur throughout normal rate, regular rhythm, normal S1, S2, no murmurs, rubs, clicks or gallops , PMI non displaced. Edema: Edema is none. Lab Results   Component Value Date/Time    Cholesterol, total 135 08/04/2022 02:50 PM    HDL Cholesterol 51 08/04/2022 02:50 PM    LDL, calculated 68.4 08/04/2022 02:50 PM    Triglyceride 78 08/04/2022 02:50 PM    CHOL/HDL Ratio 2.6 08/04/2022 02:50 PM     Lab Results   Component Value Date/Time    Sodium 139 08/04/2022 02:50 PM    Potassium 4.1 08/04/2022 02:50 PM    Chloride 104 08/04/2022 02:50 PM    CO2 28 08/04/2022 02:50 PM    Anion gap 7 08/04/2022 02:50 PM    Glucose 82 08/04/2022 02:50 PM    BUN 13 08/04/2022 02:50 PM    Creatinine 0.82 08/04/2022 02:50 PM    BUN/Creatinine ratio 16 08/04/2022 02:50 PM    GFR est AA >60 08/04/2022 02:50 PM    GFR est non-AA >60 08/04/2022 02:50 PM    Calcium 9.4 08/04/2022 02:50 PM      Wt Readings from Last 3 Encounters:   11/09/22 230 lb (104.3 kg)   08/04/22 225 lb (102.1 kg)   05/11/22 225 lb (102.1 kg)      BP Readings from Last 3 Encounters:   11/09/22 112/70   08/04/22 117/78   05/11/22 (!) 145/78      Current Outpatient Medications   Medication Sig    hydroCHLOROthiazide (HYDRODIURIL) 25 mg tablet Take 1 Tablet by mouth daily. levocetirizine (XYZAL) 5 mg tablet Take 1 tablet by mouth once daily    montelukast (SINGULAIR) 10 mg tablet Take 1 tablet by mouth once daily    gabapentin (NEURONTIN) 100 mg capsule Take 100 mg by mouth two (2) times a day. atorvastatin (LIPITOR) 40 mg tablet Take 1 tablet by mouth once daily    lisinopriL (PRINIVIL, ZESTRIL) 5 mg tablet Take 1 Tablet by mouth daily. conjugated estrogens (Premarin) 0.625 mg/gram vaginal cream Insert 1/2(one-half) gram into the vaginal 1-3 times per week as needed for vaginal dryness. multivit-min/iron/folic/lutein (CENTRUM SILVER WOMEN PO)     polyethylene glycol (MIRALAX) 17 gram/dose powder Take 17 g by mouth daily.     cholecalciferol (VITAMIN D3) 25 mcg (1,000 unit) cap Take  by mouth daily. calcium carbonate-vitamin D3 600 mg-20 mcg (800 unit) chew Take  by mouth. No current facility-administered medications for this visit. Impression see above.

## 2022-11-16 ENCOUNTER — OFFICE VISIT (OUTPATIENT)
Dept: FAMILY MEDICINE CLINIC | Age: 57
End: 2022-11-16
Payer: COMMERCIAL

## 2022-11-16 VITALS
OXYGEN SATURATION: 99 % | SYSTOLIC BLOOD PRESSURE: 121 MMHG | WEIGHT: 232 LBS | HEIGHT: 70 IN | BODY MASS INDEX: 33.21 KG/M2 | DIASTOLIC BLOOD PRESSURE: 76 MMHG | HEART RATE: 76 BPM | TEMPERATURE: 98.4 F | RESPIRATION RATE: 17 BRPM

## 2022-11-16 DIAGNOSIS — M62.838 NECK MUSCLE SPASM: ICD-10-CM

## 2022-11-16 DIAGNOSIS — G44.209 ACUTE NON INTRACTABLE TENSION-TYPE HEADACHE: Primary | ICD-10-CM

## 2022-11-16 PROCEDURE — 3078F DIAST BP <80 MM HG: CPT | Performed by: FAMILY MEDICINE

## 2022-11-16 PROCEDURE — 99214 OFFICE O/P EST MOD 30 MIN: CPT | Performed by: FAMILY MEDICINE

## 2022-11-16 PROCEDURE — 3074F SYST BP LT 130 MM HG: CPT | Performed by: FAMILY MEDICINE

## 2022-11-16 RX ORDER — CYCLOBENZAPRINE HCL 10 MG
5 TABLET ORAL
Qty: 30 TABLET | Refills: 1 | Status: SHIPPED | OUTPATIENT
Start: 2022-11-16

## 2022-11-16 RX ORDER — PREDNISONE 20 MG/1
20 TABLET ORAL
Qty: 5 TABLET | Refills: 0 | Status: SHIPPED | OUTPATIENT
Start: 2022-11-16

## 2022-11-16 RX ORDER — MELOXICAM 15 MG/1
15 TABLET ORAL DAILY
Qty: 30 TABLET | Refills: 1 | Status: SHIPPED | OUTPATIENT
Start: 2022-11-16

## 2022-11-16 NOTE — PROGRESS NOTES
[facility]    History of Present Illness:   Leonie Amos is a 61 y.o. female with history of HTN, Obesity, HLD, Murmur  CC: Neck pain  History provided by patient and Records    HPI:  Neck Pain:  Patient reports moderate Right pain from the shoulder up the neck pain that has been ongoing for the last 5 days. Patient does not have a history of chronic neck pain. Reports pain is related to Possibly sleeping wrong on her neck. - Quality: sharp, cramping, and throbbing pain of 8/10 intensity at baseline.   - Frequency: Daily  - Duration: continuous  - Radiation: Left Neck pain in the shoulder through neck  - Numbness/Decreased sensation: No  - Pain Exacerbations: sharp pain of 10/10 intensity lasting for minute(s). - Exacerbating factors: bending, twisting, nothing in particular, can turn neck more than previously  - Alleviating Factors:  heat, medication: Topicals. - Current Medications tried: Heat pack, Bengay  - Previous Back Pain: no prior neck problems    Health Maintenance  Health Maintenance Due   Topic Date Due    Pneumococcal 0-64 years (1 - PCV) Never done    DTaP/Tdap/Td series (1 - Tdap) Never done    Shingrix Vaccine Age 50> (1 of 2) Never done    COVID-19 Vaccine (5 - Booster) 05/28/2022    Colorectal Cancer Screening Combo  08/18/2022       Past Medical, Family, and Social History:     Current Outpatient Medications on File Prior to Visit   Medication Sig Dispense Refill    hydroCHLOROthiazide (HYDRODIURIL) 25 mg tablet Take 1 Tablet by mouth daily. 90 Tablet 3    levocetirizine (XYZAL) 5 mg tablet Take 1 tablet by mouth once daily 30 Tablet 5    montelukast (SINGULAIR) 10 mg tablet Take 1 tablet by mouth once daily 30 Tablet 5    gabapentin (NEURONTIN) 100 mg capsule Take 100 mg by mouth two (2) times a day. atorvastatin (LIPITOR) 40 mg tablet Take 1 tablet by mouth once daily 30 Tablet 5    lisinopriL (PRINIVIL, ZESTRIL) 5 mg tablet Take 1 Tablet by mouth daily.  90 Tablet 3    conjugated estrogens (Premarin) 0.625 mg/gram vaginal cream Insert 1/2(one-half) gram into the vaginal 1-3 times per week as needed for vaginal dryness. 30 g 2    multivit-min/iron/folic/lutein (CENTRUM SILVER WOMEN PO)       polyethylene glycol (MIRALAX) 17 gram/dose powder Take 17 g by mouth daily. 289 g 3    cholecalciferol (VITAMIN D3) 25 mcg (1,000 unit) cap Take  by mouth daily. calcium carbonate-vitamin D3 600 mg-20 mcg (800 unit) chew Take  by mouth. No current facility-administered medications on file prior to visit. Patient Active Problem List   Diagnosis Code    Severe obesity ([facility] [address] E66.01    Adult patient with history of congenital heart disease Z87.74    HTN (hypertension) I10    Heart block AV second degree I44.1    Hypercholesteremia E78.00    Heart palpitations M98.4    Systolic murmur V38.8       Social History     Socioeconomic History    Marital status: SINGLE   Tobacco Use    Smoking status: Every Day     Packs/day: 0.25     Years: 20.00     Pack years: 5.00     Types: Cigarettes    Smokeless tobacco: Never   Substance and Sexual Activity    Alcohol use: Yes    Drug use: Yes     Types: Marijuana     Social Determinants of Health     Financial Resource Strain: Low Risk     Difficulty of Paying Living Expenses: Not hard at all   Food Insecurity: No Food Insecurity    Worried About Running Out of Food in the Last Year: Never true    Ran Out of Food in the Last Year: Never true        Review of Systems   Review of Systems   Constitutional:  Negative for chills and fever. Musculoskeletal:  Positive for back pain and neck pain. Neurological:  Positive for headaches. Objective:   Visit Vitals  /76 (BP 1 Location: Right arm, BP Patient Position: Sitting, BP Cuff Size: Large adult)   Pulse 76   Temp 98.4 °F (36.9 °C) (Oral)   Resp 17   Ht 5' 10\" (1.778 m)   Wt 232 lb (105.2 kg)   SpO2 99%   BMI 33.29 kg/m²        Physical Exam  Vitals and nursing note reviewed. Constitutional:       Appearance: Normal appearance. HENT:      Head: Normocephalic and atraumatic. Neck:      Comments: Muscle spasm on the right  Cardiovascular:      Rate and Rhythm: Normal rate and regular rhythm. Pulses: Normal pulses. Heart sounds: Normal heart sounds. No murmur heard. No friction rub. No gallop. Pulmonary:      Effort: Pulmonary effort is normal.      Breath sounds: Normal breath sounds. Abdominal:      General: Abdomen is flat. Bowel sounds are normal.      Palpations: Abdomen is soft. Musculoskeletal:      Cervical back: Neck supple. No edema or erythema. Pain with movement and muscular tenderness present. Decreased range of motion. Skin:     General: Skin is warm and dry. Neurological:      Mental Status: She is alert. Pertinent Labs/Studies:      Assessment and orders:       ICD-10-CM ICD-9-CM    1. Acute non intractable tension-type headache  G44.209 339.10 cyclobenzaprine (FLEXERIL) 10 mg tablet      predniSONE (DELTASONE) 20 mg tablet      meloxicam (MOBIC) 15 mg tablet      2. Neck muscle spasm  M62.838 728.85 cyclobenzaprine (FLEXERIL) 10 mg tablet      predniSONE (DELTASONE) 20 mg tablet      meloxicam (MOBIC) 15 mg tablet        Diagnoses and all orders for this visit:    1. Acute non intractable tension-type headache  -     cyclobenzaprine (FLEXERIL) 10 mg tablet; Take 0.5 Tablets by mouth three (3) times daily as needed for Muscle Spasm(s). -     predniSONE (DELTASONE) 20 mg tablet; Take 1 Tablet by mouth daily (with breakfast). -     meloxicam (MOBIC) 15 mg tablet; Take 1 Tablet by mouth daily. 2. Neck muscle spasm  -     cyclobenzaprine (FLEXERIL) 10 mg tablet; Take 0.5 Tablets by mouth three (3) times daily as needed for Muscle Spasm(s). -     predniSONE (DELTASONE) 20 mg tablet; Take 1 Tablet by mouth daily (with breakfast). -     meloxicam (MOBIC) 15 mg tablet; Take 1 Tablet by mouth daily.   w  Follow-up and Dispositions    Return in about 4 weeks (around 12/14/2022), or if symptoms worsen or fail to improve. I have discussed the diagnosis with the patient and the intended plan as seen in the above orders. Social history, medical history, and labs were reviewed. The patient has received an after-visit summary and questions were answered concerning future plans. I have discussed medication side effects and warnings with the patient as well.     MD ELSA Rodrigez & ABIODUN CHEEMA [facility]  11/16/22

## 2022-11-16 NOTE — PROGRESS NOTES
Chief Complaint   Patient presents with    Shoulder Pain     Neck pain- x5 days duration. Right side. 1. \"Have you been to the ER, urgent care clinic since your last visit? Hospitalized since your last visit? \" No    2. \"Have you seen or consulted any other health care providers outside of the [address] [place] [address] since your last visit? \" No     3. For patients aged 39-70: Has the patient had a colonoscopy / FIT/ Cologuard? No      If the patient is female:    4. For patients aged 41-77: Has the patient had a mammogram within the past 2 years? Yes - no Care Gap present      5. For patients aged 21-65: Has the patient had a pap smear?  Yes - no Care Gap present    Health Maintenance Due   Topic Date Due    Pneumococcal 0-64 years (1 - PCV) Never done    DTaP/Tdap/Td series (1 - Tdap) Never done    Shingrix Vaccine Age 50> (1 of 2) Never done    COVID-19 Vaccine (5 - Booster) 05/28/2022    Colorectal Cancer Screening Combo  08/18/2022

## 2022-12-15 DIAGNOSIS — E78.2 MIXED HYPERLIPIDEMIA: ICD-10-CM

## 2022-12-15 RX ORDER — ATORVASTATIN CALCIUM 40 MG/1
TABLET, FILM COATED ORAL
Qty: 30 TABLET | Refills: 5 | Status: SHIPPED | OUTPATIENT
Start: 2022-12-15

## 2023-02-06 ENCOUNTER — OFFICE VISIT (OUTPATIENT)
Dept: FAMILY MEDICINE CLINIC | Age: 58
End: 2023-02-06
Payer: COMMERCIAL

## 2023-02-06 VITALS
SYSTOLIC BLOOD PRESSURE: 135 MMHG | DIASTOLIC BLOOD PRESSURE: 85 MMHG | BODY MASS INDEX: 33.79 KG/M2 | OXYGEN SATURATION: 98 % | WEIGHT: 236 LBS | TEMPERATURE: 98 F | RESPIRATION RATE: 20 BRPM | HEIGHT: 70 IN | HEART RATE: 83 BPM

## 2023-02-06 DIAGNOSIS — I10 PRIMARY HYPERTENSION: Primary | ICD-10-CM

## 2023-02-06 DIAGNOSIS — E78.00 HYPERCHOLESTEREMIA: ICD-10-CM

## 2023-02-06 DIAGNOSIS — L98.9 SKIN LESION: ICD-10-CM

## 2023-02-06 PROCEDURE — 3079F DIAST BP 80-89 MM HG: CPT | Performed by: FAMILY MEDICINE

## 2023-02-06 PROCEDURE — 99214 OFFICE O/P EST MOD 30 MIN: CPT | Performed by: FAMILY MEDICINE

## 2023-02-06 PROCEDURE — 3075F SYST BP GE 130 - 139MM HG: CPT | Performed by: FAMILY MEDICINE

## 2023-02-06 NOTE — PROGRESS NOTES
CC: follow-up HTN, HLD    HPI: Pt is a 61 y.o. female who presents for follow-up HTN, HLD. She also has a spot on her backside that he been hurting her. It is red and very tender at her underwear line. No fevers or drainage. She has been putting cornstarch and vagisil on it and it seems to be helping. Her partner was able to visualize the lesion a few days ago and states it was red and not vesicular in nature. HTN:  Checking BPs at home?: Sometimes  Logs today?: NO  Range of BPs?: Varies, sometimes higher if she has just smoked or is having a lot of pain or stress. Headaches?: NO  Blurry vision?: NO  Lower extremity edema?: NO  Smoking?: YES - working on cutting back      Past Medical History:   Diagnosis Date    Heart murmur     Since birth    HTN (hypertension)        No family history on file. Social History     Tobacco Use    Smoking status: Every Day     Packs/day: 0.25     Years: 20.00     Pack years: 5.00     Types: Cigarettes    Smokeless tobacco: Never   Substance Use Topics    Alcohol use: Yes    Drug use: Yes     Types: Marijuana       ROS:  Per HPI    PE:  Visit Vitals  /85   Pulse 83   Temp 98 °F (36.7 °C)   Resp 20   Ht 5' 10\" (1.778 m)   Wt 236 lb (107 kg)   SpO2 98%   BMI 33.86 kg/m²     Gen: Pt sitting in chair, in NAD  Head: Normocephalic, atraumatic  Eyes: Sclera anicteric, EOM grossly intact, PERRL  Ears: TM's pearly with good light reflex b/l  Nose: Normal nasal mucosa  Throat: MMM, normal lips, tongue, teeth and gums  Neck: Supple, no LAD, no thyromegaly or carotid bruits  CVS: Normal S1, S2, no m/r/g  Resp: CTAB, no wheezes or rales  : two pinpoint white, moist lesions on R thigh near groin without surrounding erythema, +small amount clear drainage  Extrem: Atraumatic, no cyanosis or edema  Pulses: 2+   Skin: Warm, dry  Neuro: Alert, oriented, appropriate      A/P:   Encounter Diagnoses     ICD-10-CM ICD-9-CM   1. Primary hypertension  I10 401.9   2.  Hypercholesteremia  E78.00 272.0   3. Skin lesion  L98.9 709.9     1. Primary hypertension: Controlled. Continue HCTZ and lisinopril at current doses. - METABOLIC PANEL, BASIC; Future    2. Hypercholesteremia:   - LIPID PANEL; Future     3. Skin lesion: Appears to be healing - could have been an abscess or ingrown hair. Will continue to monitor and pt to call if not improving. RTC in 6 months for f/u chronic conditions, or sooner prn    Discussed diagnoses in detail with patient. Medication risks/benefits/side effects discussed with patient. All of the patient's questions were addressed. The patient understands and agrees with our plan of care. The patient knows to call back if they are unsure of or forget any changes we discussed today or if the symptoms change. The patient received an After-Visit Summary which contains VS, orders, medication list and allergy list. This can be used as a \"mini-medical record\" should they have to seek medical care while out of town. Current Outpatient Medications on File Prior to Visit   Medication Sig Dispense Refill    atorvastatin (LIPITOR) 40 mg tablet Take 1 tablet by mouth once daily 30 Tablet 5    cyclobenzaprine (FLEXERIL) 10 mg tablet Take 0.5 Tablets by mouth three (3) times daily as needed for Muscle Spasm(s). 30 Tablet 1    hydroCHLOROthiazide (HYDRODIURIL) 25 mg tablet Take 1 Tablet by mouth daily. 90 Tablet 3    levocetirizine (XYZAL) 5 mg tablet Take 1 tablet by mouth once daily 30 Tablet 5    montelukast (SINGULAIR) 10 mg tablet Take 1 tablet by mouth once daily 30 Tablet 5    lisinopriL (PRINIVIL, ZESTRIL) 5 mg tablet Take 1 Tablet by mouth daily. 90 Tablet 3    conjugated estrogens (Premarin) 0.625 mg/gram vaginal cream Insert 1/2(one-half) gram into the vaginal 1-3 times per week as needed for vaginal dryness. 30 g 2    multivit-min/iron/folic/lutein (CENTRUM SILVER WOMEN PO)       polyethylene glycol (MIRALAX) 17 gram/dose powder Take 17 g by mouth daily.  289 g 3 cholecalciferol (VITAMIN D3) 25 mcg (1,000 unit) cap Take  by mouth daily. calcium carbonate-vitamin D3 600 mg-20 mcg (800 unit) chew Take  by mouth.      predniSONE (DELTASONE) 20 mg tablet Take 1 Tablet by mouth daily (with breakfast). (Patient not taking: Reported on 2/6/2023) 5 Tablet 0    meloxicam (MOBIC) 15 mg tablet Take 1 Tablet by mouth daily. (Patient not taking: Reported on 2/6/2023) 30 Tablet 1    gabapentin (NEURONTIN) 100 mg capsule Take 100 mg by mouth two (2) times a day. (Patient not taking: Reported on 2/6/2023)       No current facility-administered medications on file prior to visit.

## 2023-02-06 NOTE — PROGRESS NOTES
1. Have you been to the ER, urgent care clinic since your last visit? Hospitalized since your last visit? No    2. Have you seen or consulted any other health care providers outside of the [address] [place] [address] since your last visit? Include any pap smears or colon screening. No  Reviewed record in preparation for visit and have necessary documentation  Pt did not bring medication to office visit for review  opportunity was given for questions      3. For patients aged 39-70: Has the patient had a colonoscopy / FIT/ Cologuard? No      If the patient is female:    4. For patients aged 41-77: Has the patient had a mammogram within the past 2 years? Yes - no Care Gap present      5. For patients aged 21-65: Has the patient had a pap smear?  Yes - no Care Gap present      Goals that were addressed and/or need to be completed during or after this appointment include   Health Maintenance Due   Topic Date Due    Pneumococcal 0-64 years (1 - PCV) Never done    DTaP/Tdap/Td series (1 - Tdap) Never done    Shingles Vaccine (1 of 2) Never done    COVID-19 Vaccine (5 - Booster) 05/28/2022    Colorectal Cancer Screening Combo  08/18/2022

## 2023-02-07 LAB
ANION GAP SERPL CALC-SCNC: 4 MMOL/L (ref 5–15)
BUN SERPL-MCNC: 14 MG/DL (ref 6–20)
BUN/CREAT SERPL: 17 (ref 12–20)
CALCIUM SERPL-MCNC: 10 MG/DL (ref 8.5–10.1)
CHLORIDE SERPL-SCNC: 103 MMOL/L (ref 97–108)
CHOLEST SERPL-MCNC: 144 MG/DL
CO2 SERPL-SCNC: 29 MMOL/L (ref 21–32)
CREAT SERPL-MCNC: 0.83 MG/DL (ref 0.55–1.02)
GLUCOSE SERPL-MCNC: 93 MG/DL (ref 65–100)
HDLC SERPL-MCNC: 53 MG/DL
HDLC SERPL: 2.7 (ref 0–5)
LDLC SERPL CALC-MCNC: 76 MG/DL (ref 0–100)
POTASSIUM SERPL-SCNC: 4 MMOL/L (ref 3.5–5.1)
SODIUM SERPL-SCNC: 136 MMOL/L (ref 136–145)
TRIGL SERPL-MCNC: 75 MG/DL (ref ?–150)
VLDLC SERPL CALC-MCNC: 15 MG/DL

## 2023-03-11 DIAGNOSIS — J30.1 SEASONAL ALLERGIC RHINITIS DUE TO POLLEN: ICD-10-CM

## 2023-03-13 RX ORDER — MONTELUKAST SODIUM 10 MG/1
TABLET ORAL
Qty: 30 TABLET | Refills: 5 | Status: SHIPPED | OUTPATIENT
Start: 2023-03-13

## 2023-03-13 RX ORDER — LEVOCETIRIZINE DIHYDROCHLORIDE 5 MG/1
TABLET, FILM COATED ORAL
Qty: 30 TABLET | Refills: 5 | Status: SHIPPED | OUTPATIENT
Start: 2023-03-13

## 2023-03-23 ENCOUNTER — PATIENT MESSAGE (OUTPATIENT)
Dept: FAMILY MEDICINE CLINIC | Age: 58
End: 2023-03-23

## 2023-03-23 DIAGNOSIS — R23.2 HOT FLASHES: Primary | ICD-10-CM

## 2023-03-24 NOTE — TELEPHONE ENCOUNTER
From: Madhu Dobson  To: Gemma Niño MD  Sent: 3/23/2023 8:12 PM EDT  Subject: Gynecologist.    Can you type me a new letter to see the gynecologist that you referred me to in the past. I am now able to see her . This was the gynecologist at [facility]. Thank you.

## 2023-03-27 ENCOUNTER — PATIENT MESSAGE (OUTPATIENT)
Dept: FAMILY MEDICINE CLINIC | Age: 58
End: 2023-03-27

## 2023-04-22 DIAGNOSIS — I10 HYPERTENSION, ESSENTIAL: ICD-10-CM

## 2023-04-25 RX ORDER — LISINOPRIL 5 MG/1
TABLET ORAL
Qty: 90 TABLET | Refills: 3 | Status: SHIPPED | OUTPATIENT
Start: 2023-04-25

## 2023-04-25 NOTE — TELEPHONE ENCOUNTER
Per VO by MD.    Future Appointments   Date Time Provider Kyung Teague   11/10/2023 10:00 AM ALFREDO SALAS [facility]   11/10/2023 11:00 AM Vinayak Zamudio MD [facility]

## 2023-05-19 RX ORDER — ATORVASTATIN CALCIUM 40 MG/1
1 TABLET, FILM COATED ORAL DAILY
COMMUNITY
Start: 2022-12-15 | End: 2023-06-07 | Stop reason: SDUPTHER

## 2023-05-19 RX ORDER — MONTELUKAST SODIUM 10 MG/1
1 TABLET ORAL DAILY
COMMUNITY
Start: 2023-03-13

## 2023-05-19 RX ORDER — POLYETHYLENE GLYCOL 3350 17 G/17G
17 POWDER, FOR SOLUTION ORAL DAILY
COMMUNITY
Start: 2020-10-01

## 2023-05-19 RX ORDER — HYDROCHLOROTHIAZIDE 25 MG/1
25 TABLET ORAL DAILY
COMMUNITY
Start: 2022-09-19

## 2023-05-19 RX ORDER — CYCLOBENZAPRINE HCL 10 MG
5 TABLET ORAL 3 TIMES DAILY PRN
COMMUNITY
Start: 2022-11-16

## 2023-05-19 RX ORDER — ESTRADIOL 0.1 MG/D
1 FILM, EXTENDED RELEASE TRANSDERMAL
COMMUNITY
Start: 2023-04-14 | End: 2023-06-14 | Stop reason: SDUPTHER

## 2023-05-19 RX ORDER — TRAMADOL HYDROCHLORIDE 50 MG/1
1 TABLET ORAL 2 TIMES DAILY PRN
COMMUNITY
Start: 2023-03-24

## 2023-05-19 RX ORDER — PROGESTERONE 100 MG/1
1 CAPSULE ORAL NIGHTLY
COMMUNITY
Start: 2023-04-11

## 2023-05-19 RX ORDER — LEVOCETIRIZINE DIHYDROCHLORIDE 5 MG/1
1 TABLET, FILM COATED ORAL DAILY
COMMUNITY
Start: 2023-03-13

## 2023-05-19 RX ORDER — ESTRADIOL 10 UG/1
INSERT VAGINAL
COMMUNITY
Start: 2023-04-11

## 2023-05-19 RX ORDER — LISINOPRIL 5 MG/1
1 TABLET ORAL DAILY
COMMUNITY
Start: 2023-04-25

## 2023-06-07 RX ORDER — ATORVASTATIN CALCIUM 40 MG/1
40 TABLET, FILM COATED ORAL DAILY
Qty: 90 TABLET | Refills: 1 | Status: SHIPPED | OUTPATIENT
Start: 2023-06-07

## 2023-08-02 ENCOUNTER — TRANSCRIBE ORDERS (OUTPATIENT)
Facility: HOSPITAL | Age: 58
End: 2023-08-02

## 2023-08-02 DIAGNOSIS — Z12.31 VISIT FOR SCREENING MAMMOGRAM: Primary | ICD-10-CM

## 2023-08-12 SDOH — ECONOMIC STABILITY: FOOD INSECURITY: WITHIN THE PAST 12 MONTHS, YOU WORRIED THAT YOUR FOOD WOULD RUN OUT BEFORE YOU GOT MONEY TO BUY MORE.: NEVER TRUE

## 2023-08-12 SDOH — ECONOMIC STABILITY: HOUSING INSECURITY
IN THE LAST 12 MONTHS, WAS THERE A TIME WHEN YOU DID NOT HAVE A STEADY PLACE TO SLEEP OR SLEPT IN A SHELTER (INCLUDING NOW)?: NO

## 2023-08-12 SDOH — ECONOMIC STABILITY: TRANSPORTATION INSECURITY
IN THE PAST 12 MONTHS, HAS LACK OF TRANSPORTATION KEPT YOU FROM MEETINGS, WORK, OR FROM GETTING THINGS NEEDED FOR DAILY LIVING?: YES

## 2023-08-12 SDOH — ECONOMIC STABILITY: INCOME INSECURITY: HOW HARD IS IT FOR YOU TO PAY FOR THE VERY BASICS LIKE FOOD, HOUSING, MEDICAL CARE, AND HEATING?: NOT VERY HARD

## 2023-08-12 SDOH — ECONOMIC STABILITY: FOOD INSECURITY: WITHIN THE PAST 12 MONTHS, THE FOOD YOU BOUGHT JUST DIDN'T LAST AND YOU DIDN'T HAVE MONEY TO GET MORE.: SOMETIMES TRUE

## 2023-08-15 ENCOUNTER — OFFICE VISIT (OUTPATIENT)
Facility: CLINIC | Age: 58
End: 2023-08-15
Payer: COMMERCIAL

## 2023-08-15 VITALS
HEART RATE: 65 BPM | SYSTOLIC BLOOD PRESSURE: 124 MMHG | OXYGEN SATURATION: 99 % | HEIGHT: 70 IN | TEMPERATURE: 98 F | DIASTOLIC BLOOD PRESSURE: 73 MMHG | BODY MASS INDEX: 33.7 KG/M2 | WEIGHT: 235.4 LBS

## 2023-08-15 DIAGNOSIS — F17.200 TOBACCO DEPENDENCE: ICD-10-CM

## 2023-08-15 DIAGNOSIS — Z79.899 LONG TERM USE OF DRUG: ICD-10-CM

## 2023-08-15 DIAGNOSIS — R35.0 URINARY FREQUENCY: ICD-10-CM

## 2023-08-15 DIAGNOSIS — I10 PRIMARY HYPERTENSION: Primary | ICD-10-CM

## 2023-08-15 DIAGNOSIS — Z12.11 SCREENING FOR COLON CANCER: ICD-10-CM

## 2023-08-15 DIAGNOSIS — E78.00 HYPERCHOLESTEREMIA: ICD-10-CM

## 2023-08-15 DIAGNOSIS — Z23 NEED FOR VACCINATION: ICD-10-CM

## 2023-08-15 PROBLEM — M54.9 CHRONIC BACK PAIN: Status: ACTIVE | Noted: 2022-12-19

## 2023-08-15 PROBLEM — J30.2 SEASONAL ALLERGIES: Status: ACTIVE | Noted: 2022-12-19

## 2023-08-15 PROBLEM — G89.29 CHRONIC BACK PAIN: Status: ACTIVE | Noted: 2022-12-19

## 2023-08-15 PROBLEM — E55.9 VITAMIN D DEFICIENCY: Status: ACTIVE | Noted: 2022-12-19

## 2023-08-15 LAB
BILIRUBIN, URINE, POC: NEGATIVE
BLOOD URINE, POC: NEGATIVE
GLUCOSE URINE, POC: NEGATIVE
KETONES, URINE, POC: NEGATIVE
LEUKOCYTE ESTERASE, URINE, POC: NEGATIVE
NITRITE, URINE, POC: NEGATIVE
PH, URINE, POC: 7 (ref 4.6–8)
PROTEIN,URINE, POC: NEGATIVE
SPECIFIC GRAVITY, URINE, POC: 1.01 (ref 1–1.03)
URINALYSIS CLARITY, POC: NORMAL
URINALYSIS COLOR, POC: YELLOW
UROBILINOGEN, POC: NORMAL

## 2023-08-15 PROCEDURE — 90715 TDAP VACCINE 7 YRS/> IM: CPT | Performed by: FAMILY MEDICINE

## 2023-08-15 PROCEDURE — 90471 IMMUNIZATION ADMIN: CPT | Performed by: FAMILY MEDICINE

## 2023-08-15 PROCEDURE — 3078F DIAST BP <80 MM HG: CPT | Performed by: FAMILY MEDICINE

## 2023-08-15 PROCEDURE — 81003 URINALYSIS AUTO W/O SCOPE: CPT | Performed by: FAMILY MEDICINE

## 2023-08-15 PROCEDURE — 3074F SYST BP LT 130 MM HG: CPT | Performed by: FAMILY MEDICINE

## 2023-08-15 PROCEDURE — 99214 OFFICE O/P EST MOD 30 MIN: CPT | Performed by: FAMILY MEDICINE

## 2023-08-15 RX ORDER — ATORVASTATIN CALCIUM 40 MG/1
40 TABLET, FILM COATED ORAL DAILY
Qty: 90 TABLET | Refills: 1 | Status: SHIPPED | OUTPATIENT
Start: 2023-08-15

## 2023-08-15 RX ORDER — HYDROCHLOROTHIAZIDE 25 MG/1
25 TABLET ORAL DAILY
Qty: 90 TABLET | Refills: 1 | Status: SHIPPED | OUTPATIENT
Start: 2023-08-15

## 2023-08-15 RX ORDER — DULOXETIN HYDROCHLORIDE 30 MG/1
CAPSULE, DELAYED RELEASE ORAL
COMMUNITY
Start: 2023-08-12

## 2023-08-15 ASSESSMENT — PATIENT HEALTH QUESTIONNAIRE - PHQ9
1. LITTLE INTEREST OR PLEASURE IN DOING THINGS: 0
2. FEELING DOWN, DEPRESSED OR HOPELESS: 0
SUM OF ALL RESPONSES TO PHQ QUESTIONS 1-9: 0
SUM OF ALL RESPONSES TO PHQ QUESTIONS 1-9: 0
SUM OF ALL RESPONSES TO PHQ9 QUESTIONS 1 & 2: 0
SUM OF ALL RESPONSES TO PHQ QUESTIONS 1-9: 0
SUM OF ALL RESPONSES TO PHQ QUESTIONS 1-9: 0

## 2023-08-15 ASSESSMENT — ENCOUNTER SYMPTOMS
COUGH: 0
SHORTNESS OF BREATH: 0
BLOOD IN STOOL: 0
CONSTIPATION: 0
NAUSEA: 0
VOMITING: 0

## 2023-08-15 NOTE — PATIENT INSTRUCTIONS
Patient Education   I advised patient to get shingrix, [id number], flu and cov19 booster at local pharmacy. Learning About Benefits From Quitting Smoking  Why is it important to quit smoking? If you're thinking about quitting smoking, you may have a few reasons to be smoke-free. Your health may be one of them. When you quit smoking, you lower your risk for many serious health problems, such as cancer, lung disease, heart attack, stroke, blood vessel disease, and blindness from macular degeneration. When you're smoke-free, you get sick less often, and you heal faster. You are less likely to get colds, flu, bronchitis, and pneumonia. As a nonsmoker, you may find that your mood is better and you are less stressed. When and how will you feel healthier? Quitting has real health benefits that start from day 1 of being smoke-free. And the longer you stay smoke-free, the healthier you get and the better you feel. The first hours or days  Soon after you stop smoking, your blood pressure and heart rate go down. That means there's less stress on your heart and blood vessels. Within days, the level of carbon monoxide in your blood drops back to normal. That makes room for more oxygen. Within weeks or months  When your lungs begin to clear, you cough less and breathe deeper, so it may be easier to be active. Your sense of taste and smell should return. That means you may enjoy food more than you have since you started smoking. Over the years  Over the years, your risks of heart disease, heart attack, and stroke are lower. After 10 years, your risk of lung cancer is cut by about half. And your risk for many other types of cancer is lower too. How would quitting help others in your life? When you quit smoking, you improve the health of everyone who now breathes in your smoke. Their heart, lung, and cancer risks may drop, much like yours. They are sick less.  For babies and small children, living smoke-free

## 2023-08-15 NOTE — ASSESSMENT & PLAN NOTE
Well-controlled, continue current medications  Current treatment plan is effective, no change in therapy , lab results and schedule of future lab studies reviewed with patient, Very strongly urged to quit smoking to reduce cardiovascular risk, and reviewed medications and side effects in detail
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
## 2023-08-15 NOTE — PROGRESS NOTES
1. \"Have you been to the ER, urgent care clinic since your last visit? Hospitalized since your last visit? \" NO    2. \"Have you seen or consulted any other health care providers outside of the [address] [place] [address] since your last visit? \" Yes [facility]     3. For patients aged 43-73: Has the patient had a colonoscopy / FIT/ Cologuard? YES      If the patient is female:    4. For patients aged 43-66: Has the patient had a mammogram within the past 2 years? YES      5. For patients aged 21-65: Has the patient had a pap smear?  YES    Health Maintenance Due   Topic Date Due    Pneumococcal 0-64 years Vaccine (1 - PCV) Never done    DTaP/Tdap/Td vaccine (1 - Tdap) Never done    Shingles vaccine (1 of 2) Never done    COVID-19 Vaccine (4 - Booster for Pfizer series) 05/28/2022    Colorectal Cancer Screen  08/18/2022    Flu vaccine (1) 08/01/2023

## 2023-08-28 ENCOUNTER — OFFICE VISIT (OUTPATIENT)
Age: 58
End: 2023-08-28
Payer: COMMERCIAL

## 2023-08-28 VITALS
HEART RATE: 80 BPM | BODY MASS INDEX: 32.86 KG/M2 | WEIGHT: 229 LBS | SYSTOLIC BLOOD PRESSURE: 148 MMHG | DIASTOLIC BLOOD PRESSURE: 83 MMHG

## 2023-08-28 DIAGNOSIS — Z12.4 SCREENING FOR CERVICAL CANCER: ICD-10-CM

## 2023-08-28 DIAGNOSIS — N95.0 POSTMENOPAUSAL BLEEDING: ICD-10-CM

## 2023-08-28 DIAGNOSIS — R35.0 URINARY FREQUENCY: ICD-10-CM

## 2023-08-28 DIAGNOSIS — N95.1 MENOPAUSAL SYMPTOMS: ICD-10-CM

## 2023-08-28 DIAGNOSIS — Z01.419 ENCOUNTER FOR WELL WOMAN EXAM: Primary | ICD-10-CM

## 2023-08-28 DIAGNOSIS — N89.8 VAGINAL ODOR: ICD-10-CM

## 2023-08-28 PROCEDURE — 3079F DIAST BP 80-89 MM HG: CPT | Performed by: OBSTETRICS & GYNECOLOGY

## 2023-08-28 PROCEDURE — 99396 PREV VISIT EST AGE 40-64: CPT | Performed by: OBSTETRICS & GYNECOLOGY

## 2023-08-28 PROCEDURE — 3077F SYST BP >= 140 MM HG: CPT | Performed by: OBSTETRICS & GYNECOLOGY

## 2023-08-28 RX ORDER — ESTRADIOL 10 UG/1
INSERT VAGINAL
Qty: 8 TABLET | Refills: 1 | Status: SHIPPED | OUTPATIENT
Start: 2023-08-28

## 2023-08-28 RX ORDER — ESTRADIOL 0.1 MG/D
1 FILM, EXTENDED RELEASE TRANSDERMAL
Qty: 8 PATCH | Refills: 1 | Status: SHIPPED | OUTPATIENT
Start: 2023-08-28

## 2023-08-28 RX ORDER — PROGESTERONE 100 MG/1
100 CAPSULE ORAL NIGHTLY
Qty: 30 CAPSULE | Refills: 1 | Status: SHIPPED | OUTPATIENT
Start: 2023-08-28

## 2023-08-28 RX ORDER — MISOPROSTOL 200 UG/1
TABLET ORAL
Qty: 4 TABLET | Refills: 0 | Status: SHIPPED | OUTPATIENT
Start: 2023-08-28

## 2023-08-28 NOTE — PROGRESS NOTES
Salena Ying is a 60 y.o. female returns for an annual exam     Chief Complaint   Patient presents with    Follow-up    Annual Exam       No LMP recorded. Patient is postmenopausal.  Her periods are nonexistent in flow  Problems: no problems. She would like to discuss whether she is taking her HRT correctly. She does the patch 2 times a week along with the inserted tablet and takes the pill orally every day. Birth Control: post menopausal status. Last Pap: normal obtained 4 year(s) ago. Last Mammogram:  has a mammogram scheduled .

## 2023-08-28 NOTE — PROGRESS NOTES
Per Nursing Note:   Ruy Henry is a 60 y.o. female returns for an annual exam          Chief Complaint   Patient presents with    Follow-up    Annual Exam         No LMP recorded. Patient is postmenopausal.  Her periods are nonexistent in flow  Problems: no problems. She would like to discuss whether she is taking her HRT correctly. She does the patch 2 times a week along with the inserted tablet and takes the pill orally every day. Birth Control: post menopausal status. Last Pap: normal obtained 4 year(s) ago. Last Mammogram:  has a mammogram scheduled . Annual exam      Ruy Henry is a [id number],  60 y.o. female   No LMP recorded. Patient is postmenopausal.      Scheduled for FU visit for MHT. Requesting pap. Routine GYN care previously done by her PCP, Dr. Jimmie Izquierdo @ [facility], who has left the practice. Is seeing new doctor, but would like me to do gyn/pap. Seen 4/11/23 for menopausal sx, GSM. Opted for trial of MHT. Vivelle patch 0.1mg, proetrium 100mg qhs  Vagifem  Reviewed dosing schedule with patient. She is taking everything correctly. Sx MUCH improved. +nocturia since starting MHT    Has had 2 episodes of spotting. 2 months ago when she ran out of patch. 1 week ago, spotting after IC. Has had some vaginal odor        Colonoscopy: does fecal occult testing through PCP  Mammo:  scheduled Friday    Pap Smear:  Her most recent Pap smear was normal, HPV was negative, obtained 5/2019. She does not have a history of abnormal paps. Sexual history:    She  reports being sexually active and has had partner(s) who are male.  She reports using the following method of birth control/protection: Post-menopausal.    Past Medical History:   Diagnosis Date    Arthritis     Back pain     Depression     Heart murmur     Since birth    High cholesterol     HTN (hypertension)     Tension headache      Past Surgical History:   Procedure Laterality Date    ORTHOPEDIC

## 2023-08-28 NOTE — PATIENT INSTRUCTIONS
Cytotec (misoprostol) 2 pills the day before and again the night before ultrasound appointment. Take pain medicine 2hrs before, take with food. Aleve 2 tabs  -OR-  Ibuprofen 600-800mg (over the counter tabs are usually 200mg each, take 3 or 4).

## 2023-08-30 LAB
BACTERIA SPEC CULT: NORMAL
CC UR VC: NORMAL
SERVICE CMNT-IMP: NORMAL

## 2023-08-31 LAB
A VAGINAE DNA VAG QL NAA+PROBE: ABNORMAL SCORE
BVAB2 DNA VAG QL NAA+PROBE: ABNORMAL SCORE
C ALBICANS DNA VAG QL NAA+PROBE: NEGATIVE
C GLABRATA DNA VAG QL NAA+PROBE: NEGATIVE
MEGA1 DNA VAG QL NAA+PROBE: ABNORMAL SCORE
SPECIMEN SOURCE: ABNORMAL

## 2023-09-01 ENCOUNTER — HOSPITAL ENCOUNTER (OUTPATIENT)
Facility: HOSPITAL | Age: 58
End: 2023-09-01
Attending: FAMILY MEDICINE
Payer: COMMERCIAL

## 2023-09-01 VITALS — WEIGHT: 230 LBS | BODY MASS INDEX: 32.93 KG/M2 | HEIGHT: 70 IN

## 2023-09-01 DIAGNOSIS — Z12.31 VISIT FOR SCREENING MAMMOGRAM: ICD-10-CM

## 2023-09-01 LAB
CYTOLOGIST CVX/VAG CYTO: NORMAL
CYTOLOGY CVX/VAG DOC CYTO: NORMAL
CYTOLOGY CVX/VAG DOC THIN PREP: NORMAL
DX ICD CODE: NORMAL
HPV GENOTYPE REFLEX: NORMAL
HPV I/H RISK 4 DNA CVX QL PROBE+SIG AMP: NEGATIVE
Lab: NORMAL
OTHER STN SPEC: NORMAL
STAT OF ADQ CVX/VAG CYTO-IMP: NORMAL

## 2023-09-01 PROCEDURE — 77063 BREAST TOMOSYNTHESIS BI: CPT

## 2023-09-15 RX ORDER — LEVOCETIRIZINE DIHYDROCHLORIDE 5 MG/1
5 TABLET, FILM COATED ORAL DAILY
Qty: 90 TABLET | Refills: 1 | Status: SHIPPED | OUTPATIENT
Start: 2023-09-15

## 2023-09-15 RX ORDER — MONTELUKAST SODIUM 10 MG/1
10 TABLET ORAL DAILY
Qty: 90 TABLET | Refills: 1 | Status: SHIPPED | OUTPATIENT
Start: 2023-09-15

## 2023-10-02 ENCOUNTER — PROCEDURE VISIT (OUTPATIENT)
Age: 58
End: 2023-10-02

## 2023-10-02 DIAGNOSIS — N84.1 ENDOCERVICAL POLYP: Primary | ICD-10-CM

## 2023-10-02 DIAGNOSIS — N93.9 ABNORMAL UTERINE BLEEDING: ICD-10-CM

## 2023-10-05 RX ORDER — PROGESTERONE 100 MG/1
100 CAPSULE ORAL NIGHTLY
Qty: 90 CAPSULE | Refills: 3 | Status: SHIPPED | OUTPATIENT
Start: 2023-10-05

## 2023-10-05 RX ORDER — ESTRADIOL 0.1 MG/D
1 FILM, EXTENDED RELEASE TRANSDERMAL
Qty: 24 PATCH | Refills: 3 | Status: SHIPPED | OUTPATIENT
Start: 2023-10-05

## 2023-10-05 NOTE — TELEPHONE ENCOUNTER
60year old patient last seen in the office on 8/28/2023 for ae and had sis on 10/2/2023    Please advise regarding requested refill    Requested prescription last sent on 8/28/2023 for one month supply    Please advised    Thank you

## 2023-10-31 DIAGNOSIS — I51.7 LVH (LEFT VENTRICULAR HYPERTROPHY): ICD-10-CM

## 2023-10-31 DIAGNOSIS — R01.1 CARDIAC MURMUR: Primary | ICD-10-CM

## 2023-12-05 ENCOUNTER — OFFICE VISIT (OUTPATIENT)
Age: 58
End: 2023-12-05
Payer: COMMERCIAL

## 2023-12-05 VITALS
SYSTOLIC BLOOD PRESSURE: 110 MMHG | DIASTOLIC BLOOD PRESSURE: 70 MMHG | OXYGEN SATURATION: 98 % | HEIGHT: 70 IN | WEIGHT: 231 LBS | BODY MASS INDEX: 33.07 KG/M2 | HEART RATE: 66 BPM

## 2023-12-05 DIAGNOSIS — G89.29 CHRONIC RIGHT-SIDED LOW BACK PAIN WITHOUT SCIATICA: Primary | ICD-10-CM

## 2023-12-05 DIAGNOSIS — M54.50 CHRONIC RIGHT-SIDED LOW BACK PAIN WITHOUT SCIATICA: Primary | ICD-10-CM

## 2023-12-05 PROCEDURE — 3074F SYST BP LT 130 MM HG: CPT | Performed by: PSYCHIATRY & NEUROLOGY

## 2023-12-05 PROCEDURE — 3078F DIAST BP <80 MM HG: CPT | Performed by: PSYCHIATRY & NEUROLOGY

## 2023-12-05 PROCEDURE — 99204 OFFICE O/P NEW MOD 45 MIN: CPT | Performed by: PSYCHIATRY & NEUROLOGY

## 2023-12-05 RX ORDER — ATENOLOL 25 MG/1
1 TABLET ORAL DAILY
COMMUNITY

## 2023-12-05 RX ORDER — HYDROCODONE BITARTRATE AND ACETAMINOPHEN 5; 325 MG/1; MG/1
TABLET ORAL
COMMUNITY
Start: 2023-10-02

## 2023-12-05 NOTE — PROGRESS NOTES
Identified pt with two pt identifiers(name and ). Reviewed record in preparation for visit and have obtained necessary documentation. All patient medications has been reviewed. Chief Complaint   Patient presents with    New Patient    Back Pain     Severe lower back pain. Dr Sultana Rodriguez referred pt. Pt has done shots and laser treatments with no relief. Vitals:    23 1012   BP: 110/70   Pulse: 66   SpO2: 98%                   Coordination of Care Questionnaire:   1) Have you been to an emergency room, urgent care, or hospitalized since your last visit?   no       2.  Have seen or consulted any other health care provider since your last visit? no

## 2023-12-05 NOTE — PROGRESS NOTES
NEUROLOGY NEW PATIENT CONSULTATION      12/5/2023    RE: Shu Mario    REFERRED BY:  Lavern Fulton MD    CHIEF COMPLAINT:  This is Shu Mario is a 60 y.o. female who had concerns including New Patient and Back Pain (Severe lower back pain. Dr Lory Guajardo referred pt. Pt has done shots and laser treatments with no relief.). HPI:     For the past 1 yr, patient noted chronic lower back pain, started gradually R lower back pain, 8/10, daily,  non-radiating.    (-) weakness/ numbness. Patient seeing pain management Dr Patrice Peguero and had shots and laser. Physical therapy with no clear benefit. On Tramadol, Advil and Hydrocodone. Tried Gabapentin and Cymbalta with no clear benefit. (+) L knee surgery      MRI lumbar spine(6/13/22): Mild right and moderate left neuroforaminal narrowing at L5-S1.      Review of Systems  (-) fever  (-) rash  All other systems reviewed and are negative    PMH  Past Medical History:   Diagnosis Date    Arthritis     Back pain     Depression     Heart murmur     Since birth    High cholesterol     HTN (hypertension)     Tension headache        Social Hx  Social History     Socioeconomic History    Marital status: Single     Spouse name: None    Number of children: None    Years of education: None    Highest education level: None   Tobacco Use    Smoking status: Every Day     Packs/day: 0.25     Years: 30.00     Additional pack years: 0.00     Total pack years: 7.50     Types: Cigarettes    Smokeless tobacco: Never   Vaping Use    Vaping Use: Never used   Substance and Sexual Activity    Alcohol use: Not Currently    Drug use: Not Currently     Types: Marijuana Thais Risen)    Sexual activity: Yes     Partners: Male     Birth control/protection: Post-menopausal     Comment: Menopause     Social Determinants of Health     Financial Resource Strain: Low Risk  (8/12/2023)    Overall Financial Resource Strain (CARDIA)     Difficulty of Paying Living Expenses: Not very hard   Food Insecurity:   Recent

## 2024-01-19 ENCOUNTER — PROCEDURE VISIT (OUTPATIENT)
Age: 59
End: 2024-01-19

## 2024-01-19 DIAGNOSIS — R20.2 PARESTHESIA: Primary | ICD-10-CM

## 2024-01-19 DIAGNOSIS — G89.29 CHRONIC RIGHT-SIDED LOW BACK PAIN WITHOUT SCIATICA: ICD-10-CM

## 2024-01-19 DIAGNOSIS — M54.50 CHRONIC RIGHT-SIDED LOW BACK PAIN WITHOUT SCIATICA: ICD-10-CM

## 2024-01-19 NOTE — PROGRESS NOTES
Patient now coming with numbness and pain of the right hand for several yrs.    Had R sacroiliac joint injection c/o Dr Jones with significant reduction of pain. Patient is happy..    Has not done R hip exercises.    A> R hand numbness and pain, possible CTS vs arthritis  R sacroiliitis    P> 1. Will do EMG/NCS of the R UE with CTS protocol  2. Depending on above, will consider X-ray of R hand  3. Encouraged to do YouTube hip exercise  4. Follow up with Dr Chavez pain management for R sacroiliitis      Juan Curtis MD    
MedGastroc Tibial S1-2 Nml Nml Nml Nml Nml Nml Nml    Right VastusLat Femoral L2-4 Nml Nml Nml Nml Nml Nml Nml    Right GluteusMed SupGluteal L4-S1 Nml Nml Nml Nml Nml Nml Nml    Right Lower Lumb Parasp Rami L5,S1 Nml Nml Nml Nml Nml Nml Nml                Nerve Conduction Studies  Anti Sensory Left/Right Comparison     Stim Site L Lat (ms) R Lat (ms) L-R Lat (ms) L Amp (µV) R Amp (µV) L-R Amp (%) Site1 Site2 L Wiley (m/s) R Wiley (m/s) L-R Wiley (m/s)   Sup Fibular Anti Sensory (Lat ankle)  31.8 °C   Lower leg  1.8   17.2  Lower leg Lat ankle  56    Sural Anti Sensory (Lat Mall)  31.6 °C   Calf  2.7   11.3  Calf Lat Mall  52      Motor Left/Right Comparison     Stim Site L Lat (ms) R Lat (ms) L-R Lat (ms) L Amp (mV) R Amp (mV) L-R Amp (%) Site1 Site2 L Wiley (m/s) R Wiley (m/s) L-R Wiley (m/s)   Fibular Motor (Ext Dig Brev)  31.7 °C   Ankle  3.3   7.2  Ankle Ext Dig Brev      B Fib  11.0   5.2  B Fib Ankle  47    Poplt  12.6   5.4  Poplt B Fib  63    Tibial Motor (Abd Mendoza Brev)  31.3 °C   Ankle  3.5   9.2  Ankle Abd Mendoza Brev      Knee  11.9   8.5  Knee Ankle  46          Waveforms:

## 2024-01-30 ENCOUNTER — HOSPITAL ENCOUNTER (OUTPATIENT)
Facility: HOSPITAL | Age: 59
Discharge: HOME | End: 2024-02-02
Attending: STUDENT IN AN ORGANIZED HEALTH CARE EDUCATION/TRAINING PROGRAM
Payer: COMMERCIAL

## 2024-01-30 DIAGNOSIS — H90.3 ASNHL (ASYMMETRICAL SENSORINEURAL HEARING LOSS): ICD-10-CM

## 2024-01-30 DIAGNOSIS — F17.200 SMOKER: ICD-10-CM

## 2024-01-30 DIAGNOSIS — Z00.8 ENCOUNTER FOR OTHER GENERAL EXAMINATION: ICD-10-CM

## 2024-01-30 PROCEDURE — 6360000004 HC RX CONTRAST MEDICATION: Performed by: STUDENT IN AN ORGANIZED HEALTH CARE EDUCATION/TRAINING PROGRAM

## 2024-01-30 PROCEDURE — 70553 MRI BRAIN STEM W/O & W/DYE: CPT

## 2024-01-30 PROCEDURE — A9579 GAD-BASE MR CONTRAST NOS,1ML: HCPCS | Performed by: STUDENT IN AN ORGANIZED HEALTH CARE EDUCATION/TRAINING PROGRAM

## 2024-01-30 RX ADMIN — GADOTERIDOL 20 ML: 279.3 INJECTION, SOLUTION INTRAVENOUS at 09:44

## 2024-01-30 NOTE — TELEPHONE ENCOUNTER
Ms Sparkle Abreu wants to let MD know her feelings since being on Amoldipine  Please contact her at [phone]
Returned call, not at home, left mess to call back
homicidal or suicidal plan.         Cognition and Memory: Cognition normal. Memory is not impaired. She does not exhibit impaired recent memory or impaired remote memory.         Judgment: Judgment normal. Judgment is not impulsive or inappropriate.         BETHANY  Patricks test  negative   Yeoman's  or Gaenslen's negative        Assessment:      1. Spondylosis of lumbar region without myelopathy or radiculopathy    2. Lumbar facet arthropathy    3. Chronic bilateral low back pain without sciatica    4. Spondylosis of cervical region without myelopathy or radiculopathy    5. Neck pain    6. Chronic myofascial pain    7. Chronic pain of left knee    8. Primary osteoarthritis of left knee       Plan:      OARRS reviewed. Current MED: 0  Patient was not offered naloxone for home.   Discussed long term side effects of medications, tolerance, dependency and addiction.  Previous UDS reviewed  UDS preformed today for compliance.  Patient told can not receive any pain medications from any other source.  No evidence of abuse, diversion or aberrant behavior.  Medications and/or procedures to improve function and quality of life- patient understanding with this and that may not be pain free  Discussed with patient about safe storage of medications at home  Discussed possible weaning of medication dosing dependent on treatment/procedure results.   Discussed with patient about risks with procedure including infection, reaction to medication, increased pain, or bleeding.  Procedure notes reviewed  Plan Bilateral L-facet RFA @ L4-5 and L5-S1 under fluoroscopy for longer term pain relief. Procedure discussed in detail. Received over 90% relief from previosu L-facet RFa at L4-S1 for about a full year with improvement of mobility.   Needs to hold baby aspirin and Ibuprofen   Neck pain remains tolerable- continues to receive over 80% relief from C-facet RFA   Continue tylenol prn and Baclofen from pcp         Return for  Bilateral

## 2024-02-02 ENCOUNTER — PROCEDURE VISIT (OUTPATIENT)
Age: 59
End: 2024-02-02

## 2024-02-02 DIAGNOSIS — R20.2 PARESTHESIA: Primary | ICD-10-CM
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
## 2024-02-16 ENCOUNTER — OFFICE VISIT (OUTPATIENT)
Age: 59
End: 2024-02-16

## 2024-02-16 ENCOUNTER — ANCILLARY PROCEDURE (OUTPATIENT)
Age: 59
End: 2024-02-16
Payer: COMMERCIAL

## 2024-02-16 ENCOUNTER — PREP FOR PROCEDURE (OUTPATIENT)
Facility: HOSPITAL | Age: 59
End: 2024-02-16

## 2024-02-16 ENCOUNTER — OFFICE VISIT (OUTPATIENT)
Age: 59
End: 2024-02-16
Payer: COMMERCIAL

## 2024-02-16 VITALS
SYSTOLIC BLOOD PRESSURE: 136 MMHG | RESPIRATION RATE: 16 BRPM | BODY MASS INDEX: 32.5 KG/M2 | HEART RATE: 78 BPM | HEIGHT: 70 IN | WEIGHT: 227 LBS | DIASTOLIC BLOOD PRESSURE: 70 MMHG | OXYGEN SATURATION: 98 %

## 2024-02-16 VITALS
WEIGHT: 227 LBS | DIASTOLIC BLOOD PRESSURE: 59 MMHG | HEIGHT: 70 IN | SYSTOLIC BLOOD PRESSURE: 137 MMHG | BODY MASS INDEX: 32.5 KG/M2

## 2024-02-16 VITALS
DIASTOLIC BLOOD PRESSURE: 59 MMHG | HEART RATE: 77 BPM | WEIGHT: 230 LBS | SYSTOLIC BLOOD PRESSURE: 137 MMHG | BODY MASS INDEX: 33 KG/M2

## 2024-02-16 DIAGNOSIS — Z01.810 PRE-OPERATIVE CARDIOVASCULAR EXAMINATION: ICD-10-CM

## 2024-02-16 DIAGNOSIS — N84.1 POLYP OF CERVIX: ICD-10-CM

## 2024-02-16 DIAGNOSIS — I51.7 LVH (LEFT VENTRICULAR HYPERTROPHY): ICD-10-CM

## 2024-02-16 DIAGNOSIS — D21.9 FIBROID: ICD-10-CM

## 2024-02-16 DIAGNOSIS — N95.0 POSTMENOPAUSAL BLEEDING: Primary | ICD-10-CM

## 2024-02-16 DIAGNOSIS — N84.1 ENDOCERVICAL POLYP: ICD-10-CM

## 2024-02-16 DIAGNOSIS — R01.1 CARDIAC MURMUR: ICD-10-CM

## 2024-02-16 DIAGNOSIS — N95.0 POSTMENOPAUSAL BLEEDING: ICD-10-CM

## 2024-02-16 DIAGNOSIS — R01.1 SYSTOLIC MURMUR: ICD-10-CM

## 2024-02-16 DIAGNOSIS — E78.00 HYPERCHOLESTEREMIA: ICD-10-CM

## 2024-02-16 DIAGNOSIS — I44.1 HEART BLOCK AV SECOND DEGREE: ICD-10-CM

## 2024-02-16 DIAGNOSIS — I10 PRIMARY HYPERTENSION: ICD-10-CM

## 2024-02-16 DIAGNOSIS — R00.2 PALPITATIONS: Primary | ICD-10-CM

## 2024-02-16 PROCEDURE — 93306 TTE W/DOPPLER COMPLETE: CPT | Performed by: SPECIALIST

## 2024-02-16 PROCEDURE — 3078F DIAST BP <80 MM HG: CPT | Performed by: OBSTETRICS & GYNECOLOGY

## 2024-02-16 PROCEDURE — 3075F SYST BP GE 130 - 139MM HG: CPT | Performed by: OBSTETRICS & GYNECOLOGY

## 2024-02-16 PROCEDURE — 99213 OFFICE O/P EST LOW 20 MIN: CPT | Performed by: OBSTETRICS & GYNECOLOGY

## 2024-02-16 RX ORDER — MISOPROSTOL 200 UG/1
TABLET ORAL
Qty: 4 TABLET | Refills: 0 | Status: SHIPPED | OUTPATIENT
Start: 2024-02-16

## 2024-02-16 ASSESSMENT — PATIENT HEALTH QUESTIONNAIRE - PHQ9
2. FEELING DOWN, DEPRESSED OR HOPELESS: 0
SUM OF ALL RESPONSES TO PHQ9 QUESTIONS 1 & 2: 0
1. LITTLE INTEREST OR PLEASURE IN DOING THINGS: 0
SUM OF ALL RESPONSES TO PHQ QUESTIONS 1-9: 0
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
## 2024-02-16 NOTE — PROGRESS NOTES
Patient is having surgery 3/18/24 with Dr. Bledsoe Hysteroscopy.    had concerns including Other (Echo today ).    Vitals:    02/16/24 1153   BP: 136/70   Site: Left Upper Arm   Position: Sitting   Pulse: 78   Resp: 16   SpO2: 98%   Weight: 103 kg (227 lb)   Height: 1.778 m (5' 10\")        Chest pain No    Refills No        1. Have you been to the ER, urgent care clinic since your last visit? No       Hospitalized since your last visit? No       Where?        Date?

## 2024-02-17 LAB
ECHO AO ASC DIAM: 3.2 CM
ECHO AO ASCENDING AORTA INDEX: 1.45 CM/M2
ECHO AO ROOT DIAM: 2.5 CM
ECHO AO ROOT INDEX: 1.14 CM/M2
ECHO AV MEAN GRADIENT: 15 MMHG
ECHO AV MEAN VELOCITY: 1.8 M/S
ECHO AV PEAK GRADIENT: 32 MMHG
ECHO AV PEAK VELOCITY: 2.9 M/S
ECHO AV VELOCITY RATIO: 0.62
ECHO AV VTI: 54.1 CM
ECHO BSA: 2.25 M2
ECHO EST RA PRESSURE: 3 MMHG
ECHO LA DIAMETER INDEX: 1.55 CM/M2
ECHO LA DIAMETER: 3.4 CM
ECHO LA TO AORTIC ROOT RATIO: 1.36
ECHO LA VOL A-L A2C: 66 ML (ref 22–52)
ECHO LA VOL A-L A4C: 98 ML (ref 22–52)
ECHO LA VOL BP: 78 ML (ref 22–52)
ECHO LA VOL MOD A2C: 64 ML (ref 22–52)
ECHO LA VOL MOD A4C: 94 ML (ref 22–52)
ECHO LA VOL/BSA BIPLANE: 35 ML/M2 (ref 16–34)
ECHO LA VOLUME AREA LENGTH: 81 ML
ECHO LA VOLUME INDEX A-L A2C: 30 ML/M2 (ref 16–34)
ECHO LA VOLUME INDEX A-L A4C: 45 ML/M2 (ref 16–34)
ECHO LA VOLUME INDEX AREA LENGTH: 37 ML/M2 (ref 16–34)
ECHO LA VOLUME INDEX MOD A2C: 29 ML/M2 (ref 16–34)
ECHO LA VOLUME INDEX MOD A4C: 43 ML/M2 (ref 16–34)
ECHO LV E' LATERAL VELOCITY: 8 CM/S
ECHO LV E' SEPTAL VELOCITY: 12 CM/S
ECHO LV EDV A2C: 73 ML
ECHO LV EDV A4C: 86 ML
ECHO LV EDV BP: 79 ML (ref 56–104)
ECHO LV EDV INDEX A4C: 39 ML/M2
ECHO LV EDV INDEX BP: 36 ML/M2
ECHO LV EDV NDEX A2C: 33 ML/M2
ECHO LV EJECTION FRACTION A2C: 55 %
ECHO LV EJECTION FRACTION A4C: 55 %
ECHO LV EJECTION FRACTION BIPLANE: 54 % (ref 55–100)
ECHO LV ESV A2C: 33 ML
ECHO LV ESV A4C: 39 ML
ECHO LV ESV BP: 37 ML (ref 19–49)
ECHO LV ESV INDEX A2C: 15 ML/M2
ECHO LV ESV INDEX A4C: 18 ML/M2
ECHO LV ESV INDEX BP: 17 ML/M2
ECHO LV FRACTIONAL SHORTENING: 24 % (ref 28–44)
ECHO LV INTERNAL DIMENSION DIASTOLE INDEX: 1.73 CM/M2
ECHO LV INTERNAL DIMENSION DIASTOLIC: 3.8 CM (ref 3.9–5.3)
ECHO LV INTERNAL DIMENSION SYSTOLIC INDEX: 1.32 CM/M2
ECHO LV INTERNAL DIMENSION SYSTOLIC: 2.9 CM
ECHO LV IVSD: 1.5 CM (ref 0.6–0.9)
ECHO LV MASS 2D: 183.4 G (ref 67–162)
ECHO LV MASS INDEX 2D: 83.4 G/M2 (ref 43–95)
ECHO LV POSTERIOR WALL DIASTOLIC: 1.2 CM (ref 0.6–0.9)
ECHO LV RELATIVE WALL THICKNESS RATIO: 0.63
ECHO LVOT AV VTI INDEX: 0.68
ECHO LVOT MEAN GRADIENT: 7 MMHG
ECHO LVOT PEAK GRADIENT: 13 MMHG
ECHO LVOT PEAK VELOCITY: 1.8 M/S
ECHO LVOT VTI: 36.8 CM
ECHO MV A VELOCITY: 1.17 M/S
ECHO MV AREA PHT: 3.6 CM2
ECHO MV E DECELERATION TIME (DT): 212.4 MS
ECHO MV E VELOCITY: 1.25 M/S
ECHO MV E/A RATIO: 1.07
ECHO MV E/E' LATERAL: 15.63
ECHO MV E/E' RATIO (AVERAGED): 13.02
ECHO MV PRESSURE HALF TIME (PHT): 61.6 MS
ECHO RA AREA 4C: 13.1 CM2
ECHO RA END SYSTOLIC VOLUME APICAL 4 CHAMBER INDEX BSA: 15 ML/M2
ECHO RA VOLUME AREA LENGTH APICAL 4 CHAMBER: 35 ML
ECHO RA VOLUME: 34 ML
ECHO RIGHT VENTRICULAR SYSTOLIC PRESSURE (RVSP): 36 MMHG
ECHO RV FREE WALL PEAK S': 13 CM/S
ECHO RV INTERNAL DIMENSION: 3.3 CM
ECHO RV TAPSE: 2.9 CM (ref 1.7–?)
ECHO TV REGURGITANT MAX VELOCITY: 2.86 M/S
ECHO TV REGURGITANT PEAK GRADIENT: 33 MMHG

## 2024-02-17 PROCEDURE — 93306 TTE W/DOPPLER COMPLETE: CPT | Performed by: SPECIALIST

## 2024-02-17 NOTE — PROGRESS NOTES
Alma Ferrara     1965       Arnol Owens MD, [facility]    PCP is Chanda Vicente MD   [facility] [place]  [facility]  HPI:  Alma Ferrara is a 58 y.o. female   Date of Visit-2/16/24  Patient seen in follow-up of palpitations and mild cardiomyopathy.  History of hypertension systolic murmur and heart palpitations.  Hypertension did not tolerate Norvasc did not want diuretic and when seen in 2021 after few years was not taking meds.  Echo showed no significant valve disorder and she had increasing palpitations.  Loop monitor 11/18/2021 had 2-1 heart block on atenolol so stopped atenolol.  Echo 11/29/2021 EF 76% mild LVH LVOT velocity 2.0 peak gradient 16 no change with Valsalva.  Moderate left atrial enlargement PASP 36 mmHg.  Patient undergo hysteroscopy with OB/GYN in March.  Companied by her  today.  She says she is nervous about the results of the echo but reassured once we talk about it.  She is the palpitations seem to calm down.  She is doing well currently on HCTZ for blood pressure.  BP is 136 today  Denies chest pain, edema, syncope or shortness of breath at rest   Has no tachycardia , palpitations or sense of arrythmia         EKG-sinus rhythm at 68 left atrial enlargement.  02/16/24    ECHO (TTE) COMPLETE (PRN CONTRAST/BUBBLE/STRAIN/3D) 02/17/2024  2:28 PM (Final)    Interpretation Summary    Left Ventricle: Normal left ventricular systolic function with a visually estimated EF of 55 - 60%. Left ventricle size is normal. Moderate septal thickening. Mild posterior thickening. Normal wall motion. Normal diastolic function.    Mitral Valve: Mild annular calcification of the mitral valve. Mild regurgitation.    Tricuspid Valve: Mild regurgitation.    Left Atrium: Left atrium is mildly dilated.    Image quality is good.    Signed by: Arnol Owens MD on 2/17/2024  2:28 PM          Assessment/Plan:     No objection to the planned OB/GYN surgery for

## 2024-02-19 ENCOUNTER — PREP FOR PROCEDURE (OUTPATIENT)
Facility: HOSPITAL | Age: 59
End: 2024-02-19

## 2024-02-19 DIAGNOSIS — N95.0 POST-MENOPAUSAL BLEEDING: Primary | ICD-10-CM

## 2024-02-19 DIAGNOSIS — N84.1 POLYP OF CERVIX: ICD-10-CM

## 2024-03-02 DIAGNOSIS — I10 PRIMARY HYPERTENSION: ICD-10-CM

## 2024-03-04 RX ORDER — HYDROCHLOROTHIAZIDE 25 MG/1
25 TABLET ORAL DAILY
Qty: 90 TABLET | Refills: 0 | Status: SHIPPED | OUTPATIENT
Start: 2024-03-04

## 2024-03-04 RX ORDER — ESTRADIOL 10 UG/1
INSERT VAGINAL
Qty: 24 TABLET | Refills: 1 | Status: SHIPPED | OUTPATIENT
Start: 2024-03-04

## 2024-03-04 NOTE — TELEPHONE ENCOUNTER
58 year old patient last seen in the office on 8/28/2023 for ae and problem visit on 2/16/2024     At last was marked as taking the requested medication    Please advise regarding pended refill    Thank you

## 2024-03-08 ENCOUNTER — HOSPITAL ENCOUNTER (OUTPATIENT)
Facility: HOSPITAL | Age: 59
End: 2024-03-08
Payer: COMMERCIAL

## 2024-03-08 VITALS
TEMPERATURE: 97.5 F | RESPIRATION RATE: 20 BRPM | HEART RATE: 64 BPM | OXYGEN SATURATION: 99 % | SYSTOLIC BLOOD PRESSURE: 152 MMHG | DIASTOLIC BLOOD PRESSURE: 80 MMHG | WEIGHT: 232.5 LBS | HEIGHT: 69 IN | BODY MASS INDEX: 34.44 KG/M2

## 2024-03-08 DIAGNOSIS — N95.0 POST-MENOPAUSAL BLEEDING: ICD-10-CM

## 2024-03-08 DIAGNOSIS — N84.1 POLYP OF CERVIX: ICD-10-CM

## 2024-03-08 LAB
ANION GAP SERPL CALC-SCNC: 2 MMOL/L (ref 5–15)
BASOPHILS # BLD: 0.1 K/UL (ref 0–0.1)
BASOPHILS NFR BLD: 1 % (ref 0–1)
BUN SERPL-MCNC: 14 MG/DL (ref 6–20)
BUN/CREAT SERPL: 18 (ref 12–20)
CALCIUM SERPL-MCNC: 9.2 MG/DL (ref 8.5–10.1)
CHLORIDE SERPL-SCNC: 103 MMOL/L (ref 97–108)
CO2 SERPL-SCNC: 30 MMOL/L (ref 21–32)
CREAT SERPL-MCNC: 0.78 MG/DL (ref 0.55–1.02)
DIFFERENTIAL METHOD BLD: ABNORMAL
EOSINOPHIL # BLD: 0.1 K/UL (ref 0–0.4)
EOSINOPHIL NFR BLD: 1 % (ref 0–7)
ERYTHROCYTE [DISTWIDTH] IN BLOOD BY AUTOMATED COUNT: 17.1 % (ref 11.5–14.5)
GLUCOSE SERPL-MCNC: 89 MG/DL (ref 65–100)
HCT VFR BLD AUTO: 32.2 % (ref 35–47)
HGB BLD-MCNC: 10 G/DL (ref 11.5–16)
IMM GRANULOCYTES # BLD AUTO: 0 K/UL (ref 0–0.04)
IMM GRANULOCYTES NFR BLD AUTO: 0 % (ref 0–0.5)
LYMPHOCYTES # BLD: 2.4 K/UL (ref 0.8–3.5)
LYMPHOCYTES NFR BLD: 33 % (ref 12–49)
MCH RBC QN AUTO: 24.2 PG (ref 26–34)
MCHC RBC AUTO-ENTMCNC: 31.1 G/DL (ref 30–36.5)
MCV RBC AUTO: 78 FL (ref 80–99)
MONOCYTES # BLD: 0.7 K/UL (ref 0–1)
MONOCYTES NFR BLD: 9 % (ref 5–13)
NEUTS SEG # BLD: 4.2 K/UL (ref 1.8–8)
NEUTS SEG NFR BLD: 56 % (ref 32–75)
NRBC # BLD: 0 K/UL (ref 0–0.01)
NRBC BLD-RTO: 0 PER 100 WBC
PLATELET # BLD AUTO: 350 K/UL (ref 150–400)
PMV BLD AUTO: 10.5 FL (ref 8.9–12.9)
POTASSIUM SERPL-SCNC: 3.9 MMOL/L (ref 3.5–5.1)
RBC # BLD AUTO: 4.13 M/UL (ref 3.8–5.2)
SODIUM SERPL-SCNC: 135 MMOL/L (ref 136–145)
WBC # BLD AUTO: 7.5 K/UL (ref 3.6–11)

## 2024-03-08 PROCEDURE — 36415 COLL VENOUS BLD VENIPUNCTURE: CPT

## 2024-03-08 PROCEDURE — 80048 BASIC METABOLIC PNL TOTAL CA: CPT

## 2024-03-08 PROCEDURE — 85025 COMPLETE CBC W/AUTO DIFF WBC: CPT

## 2024-03-08 RX ORDER — ASPIRIN 81 MG/1
81 TABLET ORAL EVERY MORNING
COMMUNITY

## 2024-03-08 RX ORDER — BIOTIN 1 MG
1 TABLET ORAL EVERY MORNING
COMMUNITY

## 2024-03-08 NOTE — PERIOP NOTE
[facility]                   [address] [place] [address]   MAIN OR                                  [phone]   MAIN PRE OP                          [phone]                                                                                AMBULATORY PRE OP          [phone]  PRE-ADMISSION TESTING    [phone]     Surgery Date:  3/18/2024       Is surgery arrival time given by surgeon?  NO  If “NO”, [place] staff will call you between 3 and 7pm the day before your surgery with your arrival time. (If your surgery is on a Monday, we will call you the Friday before.)    Call [phone] after 7pm Monday-Friday if you did not receive this call.    INSTRUCTIONS BEFORE YOUR SURGERY   When You  Arrive Arrive at the 2nd Floor Admitting Desk on the day of your surgery  Have your insurance card, photo ID, and any copayment (if needed)   Food   and   Drink NO food or drink after midnight the night before surgery    This means NO water, gum, mints, coffee, juice, etc.  No alcohol (beer, wine, liquor) 24 hours before and after surgery   Medications to   TAKE   Morning of Surgery MEDICATIONS TO TAKE THE MORNING OF SURGERY WITH A SIP OF WATER:   none   Medications  To  STOP      7 days before surgery Non-Steroidal anti-inflammatory Drugs (NSAID's): for example, Ibuprofen (Advil, Motrin), Naproxen (Aleve)  Aspirin, if taking for pain   Herbal supplements, vitamins, and fish oil  Other:  (Pain medications not listed above, including Tylenol may be taken)   Blood  Thinners If you take  Aspirin, Plavix, Coumadin, or any blood-thinning or anti-blood clot medicine, talk to the doctor who prescribed the medications for pre-operative instructions.   Bathing Clothing  Jewelry  Valuables     If you shower the morning of surgery, please do not apply anything to your skin (lotions, powders, deodorant, or makeup, especially mascara)  Do not shave or trim anywhere 24 hours before  surgery  Wear your hair loose or down; no pony-tails, buns, or metal hair clips  Wear loose, comfortable, clean clothes  Wear glasses instead of contacts  Leave money, valuables, and jewelry, including body piercings, at home     Going Home - or Spending the Night SAME-DAY SURGERY: You must have a responsible adult drive you home and stay with you 24 hours after surgery  ADMITS: If your doctor is keeping you in the hospital after surgery, leave personal belongings/luggage in your car until you have a hospital room number.    Hospital discharge time is 12 noon  Drivers must be here before 12 noon unless you are told differently   Special Instructions Have pads at home for post op use       Follow all instructions so your surgery won’t be cancelled.  Please, be on time.                    If a situation occurs and you are delayed the day of surgery, call [phone] .    If your physical condition changes (like a fever, cold, flu, etc.) call your surgeon.    Home medication(s) reviewed and verified verbally with list during PAT appointment.    The patient was contacted in person.    The patient verbalizes understanding of all instructions and does not need reinforcement.

## 2024-03-12 RX ORDER — LEVOCETIRIZINE DIHYDROCHLORIDE 5 MG/1
5 TABLET, FILM COATED ORAL DAILY
Qty: 90 TABLET | Refills: 0 | Status: SHIPPED | OUTPATIENT
Start: 2024-03-12

## 2024-03-12 RX ORDER — MONTELUKAST SODIUM 10 MG/1
10 TABLET ORAL DAILY
Qty: 90 TABLET | Refills: 0 | Status: SHIPPED | OUTPATIENT
Start: 2024-03-12

## 2024-03-15 ENCOUNTER — TELEPHONE (OUTPATIENT)
Age: 59
End: 2024-03-15

## 2024-03-15 NOTE — TELEPHONE ENCOUNTER
Two patient identifiers used      58 year old patient last seen in the office on 2/19/2024 and is scheduled for surgery on 3/18/2024   Surgeon Role   Olivia Hollins MD Primary    Procedure Laterality Anesthesia   HYSTEROSCOPY, DILATATION AND CURETTAGE, POLYPECTOMY            Patient is calling to say that since Tuesday she is having cold symptoms , scratchy throat, sneezing and watery eyes.    Patient reports she has seasonal allergies and has prescription medication to take    Patient was advised per Dr Hollins to increase po fluids, take her prescription medications, if feeling worse or has temperature to check covid test     Patient advised to come to have the surgery but to know if she has temperature, tests positive for covid 19 or is having breathing issues she may be asked to reschedule the surgery    Patient verbalized understanding.

## 2024-03-18 ENCOUNTER — HOSPITAL ENCOUNTER (OUTPATIENT)
Facility: HOSPITAL | Age: 59
Setting detail: OUTPATIENT SURGERY
Discharge: HOME | End: 2024-03-18
Attending: OBSTETRICS & GYNECOLOGY | Admitting: OBSTETRICS & GYNECOLOGY
Payer: COMMERCIAL

## 2024-03-18 ENCOUNTER — ANESTHESIA (OUTPATIENT)
Facility: HOSPITAL | Age: 59
End: 2024-03-18
Payer: COMMERCIAL

## 2024-03-18 ENCOUNTER — ANESTHESIA EVENT (OUTPATIENT)
Facility: HOSPITAL | Age: 59
End: 2024-03-18
Payer: COMMERCIAL

## 2024-03-18 VITALS
SYSTOLIC BLOOD PRESSURE: 150 MMHG | OXYGEN SATURATION: 100 % | DIASTOLIC BLOOD PRESSURE: 82 MMHG | BODY MASS INDEX: 33.89 KG/M2 | WEIGHT: 229.5 LBS | RESPIRATION RATE: 20 BRPM | HEART RATE: 86 BPM | TEMPERATURE: 98.3 F

## 2024-03-18 DIAGNOSIS — N95.0 POST-MENOPAUSAL BLEEDING: ICD-10-CM

## 2024-03-18 DIAGNOSIS — N84.1 POLYP OF CERVIX: ICD-10-CM

## 2024-03-18 DIAGNOSIS — G89.18 POST-OP PAIN: Primary | ICD-10-CM

## 2024-03-18 PROCEDURE — 3600000014 HC SURGERY LEVEL 4 ADDTL 15MIN: Performed by: OBSTETRICS & GYNECOLOGY

## 2024-03-18 PROCEDURE — 7100000010 HC PHASE II RECOVERY - FIRST 15 MIN: Performed by: OBSTETRICS & GYNECOLOGY

## 2024-03-18 PROCEDURE — 88305 TISSUE EXAM BY PATHOLOGIST: CPT

## 2024-03-18 PROCEDURE — 3700000001 HC ADD 15 MINUTES (ANESTHESIA): Performed by: OBSTETRICS & GYNECOLOGY

## 2024-03-18 PROCEDURE — 7100000000 HC PACU RECOVERY - FIRST 15 MIN: Performed by: OBSTETRICS & GYNECOLOGY

## 2024-03-18 PROCEDURE — 6370000000 HC RX 637 (ALT 250 FOR IP): Performed by: OBSTETRICS & GYNECOLOGY

## 2024-03-18 PROCEDURE — 6360000002 HC RX W HCPCS: Performed by: OBSTETRICS & GYNECOLOGY

## 2024-03-18 PROCEDURE — 3600000004 HC SURGERY LEVEL 4 BASE: Performed by: OBSTETRICS & GYNECOLOGY

## 2024-03-18 PROCEDURE — 7100000001 HC PACU RECOVERY - ADDTL 15 MIN: Performed by: OBSTETRICS & GYNECOLOGY

## 2024-03-18 PROCEDURE — 2580000003 HC RX 258: Performed by: STUDENT IN AN ORGANIZED HEALTH CARE EDUCATION/TRAINING PROGRAM

## 2024-03-18 PROCEDURE — 6360000002 HC RX W HCPCS: Performed by: NURSE ANESTHETIST, CERTIFIED REGISTERED

## 2024-03-18 PROCEDURE — 6370000000 HC RX 637 (ALT 250 FOR IP): Performed by: ANESTHESIOLOGY

## 2024-03-18 PROCEDURE — 2709999900 HC NON-CHARGEABLE SUPPLY: Performed by: OBSTETRICS & GYNECOLOGY

## 2024-03-18 PROCEDURE — 2500000003 HC RX 250 WO HCPCS: Performed by: NURSE ANESTHETIST, CERTIFIED REGISTERED

## 2024-03-18 PROCEDURE — 7100000011 HC PHASE II RECOVERY - ADDTL 15 MIN: Performed by: OBSTETRICS & GYNECOLOGY

## 2024-03-18 PROCEDURE — 2580000003 HC RX 258: Performed by: OBSTETRICS & GYNECOLOGY

## 2024-03-18 PROCEDURE — 3700000000 HC ANESTHESIA ATTENDED CARE: Performed by: OBSTETRICS & GYNECOLOGY

## 2024-03-18 PROCEDURE — 2720000010 HC SURG SUPPLY STERILE: Performed by: OBSTETRICS & GYNECOLOGY

## 2024-03-18 RX ORDER — FAMOTIDINE 10 MG/ML
INJECTION, SOLUTION INTRAVENOUS PRN
Status: DISCONTINUED | OUTPATIENT
Start: 2024-03-18 | End: 2024-03-18 | Stop reason: SDUPTHER

## 2024-03-18 RX ORDER — SODIUM CHLORIDE, SODIUM LACTATE, POTASSIUM CHLORIDE, CALCIUM CHLORIDE 600; 310; 30; 20 MG/100ML; MG/100ML; MG/100ML; MG/100ML
INJECTION, SOLUTION INTRAVENOUS CONTINUOUS
Status: DISCONTINUED | OUTPATIENT
Start: 2024-03-18 | End: 2024-03-18 | Stop reason: HOSPADM

## 2024-03-18 RX ORDER — MIDAZOLAM HYDROCHLORIDE 2 MG/2ML
2 INJECTION, SOLUTION INTRAMUSCULAR; INTRAVENOUS
Status: DISCONTINUED | OUTPATIENT
Start: 2024-03-18 | End: 2024-03-18 | Stop reason: HOSPADM

## 2024-03-18 RX ORDER — PROPOFOL 10 MG/ML
INJECTION, EMULSION INTRAVENOUS PRN
Status: DISCONTINUED | OUTPATIENT
Start: 2024-03-18 | End: 2024-03-18 | Stop reason: SDUPTHER

## 2024-03-18 RX ORDER — ONDANSETRON 2 MG/ML
4 INJECTION INTRAMUSCULAR; INTRAVENOUS
Status: DISCONTINUED | OUTPATIENT
Start: 2024-03-18 | End: 2024-03-18 | Stop reason: HOSPADM

## 2024-03-18 RX ORDER — KETOROLAC TROMETHAMINE 30 MG/ML
INJECTION, SOLUTION INTRAMUSCULAR; INTRAVENOUS PRN
Status: DISCONTINUED | OUTPATIENT
Start: 2024-03-18 | End: 2024-03-18 | Stop reason: SDUPTHER

## 2024-03-18 RX ORDER — LIDOCAINE HYDROCHLORIDE 10 MG/ML
1 INJECTION, SOLUTION EPIDURAL; INFILTRATION; INTRACAUDAL; PERINEURAL
Status: DISCONTINUED | OUTPATIENT
Start: 2024-03-18 | End: 2024-03-18 | Stop reason: HOSPADM

## 2024-03-18 RX ORDER — HYDROCODONE BITARTRATE AND ACETAMINOPHEN 5; 325 MG/1; MG/1
1 TABLET ORAL EVERY 6 HOURS PRN
Qty: 5 TABLET | Refills: 0 | Status: SHIPPED | OUTPATIENT
Start: 2024-03-18 | End: 2024-03-21

## 2024-03-18 RX ORDER — TRANEXAMIC ACID 100 MG/ML
INJECTION, SOLUTION INTRAVENOUS PRN
Status: DISCONTINUED | OUTPATIENT
Start: 2024-03-18 | End: 2024-03-18 | Stop reason: SDUPTHER

## 2024-03-18 RX ORDER — MIDAZOLAM HYDROCHLORIDE 1 MG/ML
INJECTION INTRAMUSCULAR; INTRAVENOUS PRN
Status: DISCONTINUED | OUTPATIENT
Start: 2024-03-18 | End: 2024-03-18 | Stop reason: SDUPTHER

## 2024-03-18 RX ORDER — HYDROCODONE BITARTRATE AND ACETAMINOPHEN 5; 325 MG/1; MG/1
1 TABLET ORAL ONCE
Status: COMPLETED | OUTPATIENT
Start: 2024-03-18 | End: 2024-03-18

## 2024-03-18 RX ORDER — FENTANYL CITRATE 50 UG/ML
INJECTION, SOLUTION INTRAMUSCULAR; INTRAVENOUS PRN
Status: DISCONTINUED | OUTPATIENT
Start: 2024-03-18 | End: 2024-03-18 | Stop reason: SDUPTHER

## 2024-03-18 RX ORDER — DIPHENHYDRAMINE HYDROCHLORIDE 50 MG/ML
12.5 INJECTION INTRAMUSCULAR; INTRAVENOUS
Status: DISCONTINUED | OUTPATIENT
Start: 2024-03-18 | End: 2024-03-18 | Stop reason: HOSPADM

## 2024-03-18 RX ORDER — DEXAMETHASONE SODIUM PHOSPHATE 4 MG/ML
INJECTION, SOLUTION INTRA-ARTICULAR; INTRALESIONAL; INTRAMUSCULAR; INTRAVENOUS; SOFT TISSUE PRN
Status: DISCONTINUED | OUTPATIENT
Start: 2024-03-18 | End: 2024-03-18 | Stop reason: SDUPTHER

## 2024-03-18 RX ORDER — FENTANYL CITRATE 50 UG/ML
100 INJECTION, SOLUTION INTRAMUSCULAR; INTRAVENOUS
Status: DISCONTINUED | OUTPATIENT
Start: 2024-03-18 | End: 2024-03-18 | Stop reason: HOSPADM

## 2024-03-18 RX ORDER — LIDOCAINE HYDROCHLORIDE 20 MG/ML
INJECTION, SOLUTION EPIDURAL; INFILTRATION; INTRACAUDAL; PERINEURAL PRN
Status: DISCONTINUED | OUTPATIENT
Start: 2024-03-18 | End: 2024-03-18 | Stop reason: SDUPTHER

## 2024-03-18 RX ORDER — ONDANSETRON 2 MG/ML
INJECTION INTRAMUSCULAR; INTRAVENOUS PRN
Status: DISCONTINUED | OUTPATIENT
Start: 2024-03-18 | End: 2024-03-18 | Stop reason: SDUPTHER

## 2024-03-18 RX ORDER — IBUPROFEN 600 MG/1
600 TABLET ORAL EVERY 6 HOURS PRN
Qty: 10 TABLET | Refills: 0 | Status: SHIPPED | OUTPATIENT
Start: 2024-03-18

## 2024-03-18 RX ORDER — NALOXONE HYDROCHLORIDE 0.4 MG/ML
INJECTION, SOLUTION INTRAMUSCULAR; INTRAVENOUS; SUBCUTANEOUS PRN
Status: DISCONTINUED | OUTPATIENT
Start: 2024-03-18 | End: 2024-03-18 | Stop reason: HOSPADM

## 2024-03-18 RX ADMIN — FENTANYL CITRATE 50 MCG: 50 INJECTION, SOLUTION INTRAMUSCULAR; INTRAVENOUS at 08:46

## 2024-03-18 RX ADMIN — DEXAMETHASONE SODIUM PHOSPHATE 4 MG: 4 INJECTION INTRA-ARTICULAR; INTRALESIONAL; INTRAMUSCULAR; INTRAVENOUS; SOFT TISSUE at 07:46

## 2024-03-18 RX ADMIN — PROPOFOL 180 MG: 10 INJECTION, EMULSION INTRAVENOUS at 07:33

## 2024-03-18 RX ADMIN — KETOROLAC TROMETHAMINE 30 MG: 30 INJECTION, SOLUTION INTRAMUSCULAR; INTRAVENOUS at 09:13

## 2024-03-18 RX ADMIN — FENTANYL CITRATE 25 MCG: 50 INJECTION, SOLUTION INTRAMUSCULAR; INTRAVENOUS at 07:26

## 2024-03-18 RX ADMIN — HYDROCODONE BITARTRATE AND ACETAMINOPHEN 1 TABLET: 5; 325 TABLET ORAL at 10:12

## 2024-03-18 RX ADMIN — PROPOFOL 20 MG: 10 INJECTION, EMULSION INTRAVENOUS at 07:58

## 2024-03-18 RX ADMIN — LIDOCAINE HYDROCHLORIDE 100 MG: 20 INJECTION, SOLUTION EPIDURAL; INFILTRATION; INTRACAUDAL; PERINEURAL at 07:32

## 2024-03-18 RX ADMIN — SODIUM CHLORIDE, POTASSIUM CHLORIDE, SODIUM LACTATE AND CALCIUM CHLORIDE: 600; 310; 30; 20 INJECTION, SOLUTION INTRAVENOUS at 06:22

## 2024-03-18 RX ADMIN — FENTANYL CITRATE 25 MCG: 50 INJECTION, SOLUTION INTRAMUSCULAR; INTRAVENOUS at 07:58

## 2024-03-18 RX ADMIN — FENTANYL CITRATE 25 MCG: 50 INJECTION, SOLUTION INTRAMUSCULAR; INTRAVENOUS at 07:29

## 2024-03-18 RX ADMIN — FENTANYL CITRATE 25 MCG: 50 INJECTION, SOLUTION INTRAMUSCULAR; INTRAVENOUS at 08:18

## 2024-03-18 RX ADMIN — TRANEXAMIC ACID 1000 MG: 100 INJECTION, SOLUTION INTRAVENOUS at 09:13

## 2024-03-18 RX ADMIN — FAMOTIDINE 20 MG: 10 INJECTION, SOLUTION INTRAVENOUS at 07:40

## 2024-03-18 RX ADMIN — FENTANYL CITRATE 50 MCG: 50 INJECTION, SOLUTION INTRAMUSCULAR; INTRAVENOUS at 08:22

## 2024-03-18 RX ADMIN — ONDANSETRON 4 MG: 2 INJECTION INTRAMUSCULAR; INTRAVENOUS at 07:40

## 2024-03-18 RX ADMIN — MIDAZOLAM HYDROCHLORIDE 2 MG: 1 INJECTION, SOLUTION INTRAMUSCULAR; INTRAVENOUS at 07:26

## 2024-03-18 ASSESSMENT — PAIN - FUNCTIONAL ASSESSMENT: PAIN_FUNCTIONAL_ASSESSMENT: 0-10

## 2024-03-18 ASSESSMENT — PAIN DESCRIPTION - LOCATION: LOCATION: PELVIS

## 2024-03-18 ASSESSMENT — PAIN SCALES - GENERAL: PAINLEVEL_OUTOF10: 5

## 2024-03-18 ASSESSMENT — LIFESTYLE VARIABLES: SMOKING_STATUS: 1

## 2024-03-18 NOTE — PROGRESS NOTES
S- no new c/o.  Took cytotec PV yestereday and this AM ~ 0100.  No vaginal bleeding or GI side effects.  Has not had vaginal bleeding for 21d.  O - lungs CTAB        CV RRR with II/VI systolic murmur (congenital)  A&P - Pt seen and examined.  There has been no interval change from H&P. Patient is ready for surgery today as planned.

## 2024-03-18 NOTE — ANESTHESIA PRE PROCEDURE
Department of Anesthesiology  Preprocedure Note       Name:  Alma Ferrara   Age:  58 y.o.  :  1965                                          MRN:  [id number]         Date:  3/18/2024      Surgeon: Surgeon(s):  Olivia Hollins MD    Procedure: Procedure(s):  HYSTEROSCOPY, DILATATION AND CURETTAGE, POLYPECTOMY    Medications prior to admission:   Prior to Admission medications    Medication Sig Start Date End Date Taking? Authorizing Provider   levocetirizine (XYZAL) 5 MG tablet Take 1 tablet by mouth once daily 3/12/24   Chanda Vicente MD   montelukast (SINGULAIR) 10 MG tablet Take 1 tablet by mouth once daily 3/12/24   Chanda Vicente MD   aspirin 81 MG EC tablet Take 1 tablet by mouth every morning    Russ Peraza MD   Multiple Vitamins-Minerals (CENTRUM SILVER PO) Take 1 tablet by mouth every morning    Russ Peraza MD   GLUCOSAMINE-CHONDROITIN PO Take 1 tablet by mouth every morning Ultra Joint  Glucosamine 1500mg  Chondroitin 1200mg    Russ Peraza MD   Biotin 1000 MCG TABS Take 1 tablet by mouth every morning    Russ Peraza MD   diphenhydrAMINE HCl (BENADRYL ALLERGY PO) Take 1 tablet by mouth daily as needed    Russ Peraza MD   hydroCHLOROthiazide (HYDRODIURIL) 25 MG tablet Take 1 tablet by mouth once daily 3/4/24   Chanda Vicente MD   Estradiol (YUVAFEM) 10 MCG TABS vaginal tablet INSERT 1 TABLET VAGINALLY TWICE WEEKLY 3/4/24   Olivia Hollins MD   miSOPROStol (CYTOTEC) 200 MCG tablet Total of 4 tabs (2 tabs each dose) PO, vaginally, or buccal the day before precedure.  2 tabs in the morning and again 2 tabs in the evening. 24   Olivia Hollins MD   HYDROcodone-acetaminophen (NORCO) 5-325 MG per tablet Take by mouth as needed for Pain. 10/2/23   Russ Peraza MD   progesterone (PROMETRIUM) 100 MG CAPS capsule TAKE 1 CAPSULE BY MOUTH NIGHTLY 10/5/23   Olivia Hollins MD   DULoxetine (CYMBALTA) 30 MG extended release capsule Take 1 capsule by mouth

## 2024-03-18 NOTE — BRIEF OP NOTE
Brief Postoperative Note      Patient: Alma Ferrara  YOB: 1965  MRN: [id number]    Date of Procedure: 3/18/2024    Pre-Op Diagnosis Codes:     * Postmenopausal bleeding [N95.0]     * Polyp of cervix [N84.1]    Post-Op Diagnosis: postmenopausal bleeding; large posterior submucosal fibroid;  questionable endocervical polyp       Procedure(s):  HYSTEROSCOPY, DILATATION AND CURETTAGE, MYOMECTOMY    Surgeon(s):  Olivia Hollins MD    Assistant:  * No surgical staff found *    Anesthesia: General    Estimated Blood Loss (mL): Minimal  Hysteroscopy fluid: NS, deficit ~1750cc, minus additional fluid in drapes and floor towel.    Complications: None    Specimens:   ID Type Source Tests Collected by Time Destination   1 : Submucosal fibroid Tissue Uterus SURGICAL PATHOLOGY Olivia Hollins MD 3/18/2024 0908    2 : Endocervical curettings Tissue Uterus SURGICAL PATHOLOGY Olivia Hollins MD 3/18/2024 0913        Implants:  * No implants in log *      Drains: * No LDAs found *    Findings: Large submucosal fibroid arising from post wall.      Electronically signed by Olivia Hollins MD on 3/18/2024 at 9:21 AM

## 2024-03-18 NOTE — DISCHARGE INSTRUCTIONS
marked/highlighted showing the most common side effects from   the classes of drugs on your prescriptions)  []     Medication Prescription(s) x *** ( [] These have been sent electronically to your pharmacy by your surgeon,   - OR -       your surgeon has already provided these to you during a previous/pre-op office visit)      The following personal items collected during your admission are returned to you:   Clothing:  street cloths  Other Valuables:  glasses         Corky Maldonado [facility]        [address] [facility]                                      [address] [place] [address]                    Phone [phone]                        Fax [phone]

## 2024-03-18 NOTE — ANESTHESIA POSTPROCEDURE EVALUATION
Department of Anesthesiology  Postprocedure Note    Patient: Alma Ferrara  MRN: [id number]  YOB: 1965  Date of evaluation: 3/18/2024    Procedure Summary       Date: 03/18/24 Room / Location: [facility] MAIN OR  / [facility] MAIN OR    Anesthesia Start: 0726 Anesthesia Stop: 0936    Procedure: HYSTEROSCOPY, DILATATION AND CURETTAGE, MYOMECTOMY (Perineum) Diagnosis:       Postmenopausal bleeding      Polyp of cervix      (Postmenopausal bleeding [N95.0])      (Polyp of cervix [N84.1])    Surgeons: Olivia Hollins MD Responsible Provider: Guilherme Cisneros MD    Anesthesia Type: General ASA Status: 2            Anesthesia Type: General    Analy Phase I: Analy Score: 10    Analy Phase II: Analy Score: 5    Anesthesia Post Evaluation    Patient location during evaluation: PACU  Patient participation: complete - patient participated  Level of consciousness: awake and alert  Pain score: 3  Airway patency: patent  Nausea & Vomiting: no vomiting and no nausea  Cardiovascular status: hemodynamically stable  Respiratory status: room air  Hydration status: stable  Multimodal analgesia pain management approach    No notable events documented.

## 2024-03-19 NOTE — OP NOTE
[facility]          [address] [place]  [address]                            OPERATIVE REPORT      PATIENT NAME: KENYA ESPOSITO                  : 1965  MED REC NO: [id number]                       ROOM: OR  ACCOUNT NO: [id number]                       ADMIT DATE: 2024  PROVIDER: Olivia Hollins MD    DATE OF SERVICE:  2024    PREOPERATIVE DIAGNOSES:       1. Postmenopausal and postcoital bleeding.     2. Questionable endocervical polyp.     3. Submucosal fibroids.    POSTOPERATIVE DIAGNOSES:       1. Postmenopausal and postcoital bleeding.     2. Questionable endocervical polyp.     3. Submucosal fibroids.    PROCEDURES PERFORMED:  Hysteroscopy, resection of submucosal fibroid, and endocervical curettage.    SURGEON:  Olivia Hollins MD    ASSISTANT:  None.    ANESTHESIA:  General via laryngeal mask airway.    ESTIMATED BLOOD LOSS:  Minimal.  Hysteroscopy fluid normal saline with a deficit of 1750 mL (minus additional fluid on the floor towel).    SPECIMENS REMOVED:       1. Submucosal fibroid.     2. Endocervical curettings.    INTRAOPERATIVE FINDINGS:  A large submucosal fibroid arising from the posterior wall.  Normal tubal ostia bilaterally, otherwise atrophic appearing endometrium.  Questionable small endocervical polyp.     COMPLICATIONS:  None.    IMPLANTS:  None.    INDICATIONS:  This is a 58-year-old postmenopausal woman who has had persistent postmenopausal and postcoital bleeding.  Bleeding has been primarily spotting, although she did have an episode of heavier bleeding within the last few months.  A saline infusion sonogram was done in the office.  This demonstrated a thin appearing endometrium.  A posterior submucosal fibroid was noted, however, on ultrasound, only approximately 20% appeared to be protruding into the cavity.  There was also questionable endocervical polyp.    DESCRIPTION OF PROCEDURE:  Risks, benefits, and

## 2024-03-28 NOTE — PROGRESS NOTES
Alma Ferrara is a 58 y.o. female presents for a problem visit.    Chief Complaint   Patient presents with    Post-Op Check     No LMP recorded. Patient is postmenopausal.  Birth Control: post menopausal status.  Last Pap: normal obtained 8/2023    The patient is here today for post op check from 3/18/24 HYSTEROSCOPY, DILATATION AND CURETTAGE, MYOMECTOMY     FINAL PATHOLOGIC DIAGNOSIS     1. Uterus, myometrial nodule, myomectomy:        Submucosal leiomyoma, multiple fragments, 4.5 cm in greatest   dimension     2. Endocervix, curettings:        Blood and scant superficial fragments of benign cervical mucosa   Negative for dysplasia, malignancy or viral cytopathic effect

## 2024-04-02 ENCOUNTER — OFFICE VISIT (OUTPATIENT)
Age: 59
End: 2024-04-02
Payer: COMMERCIAL

## 2024-04-02 VITALS
HEART RATE: 80 BPM | DIASTOLIC BLOOD PRESSURE: 83 MMHG | WEIGHT: 227 LBS | BODY MASS INDEX: 33.52 KG/M2 | SYSTOLIC BLOOD PRESSURE: 137 MMHG

## 2024-04-02 DIAGNOSIS — D25.0 FIBROIDS, SUBMUCOSAL: ICD-10-CM

## 2024-04-02 DIAGNOSIS — N95.0 POSTMENOPAUSAL BLEEDING: Primary | ICD-10-CM

## 2024-04-02 PROCEDURE — 3079F DIAST BP 80-89 MM HG: CPT | Performed by: OBSTETRICS & GYNECOLOGY

## 2024-04-02 PROCEDURE — 3075F SYST BP GE 130 - 139MM HG: CPT | Performed by: OBSTETRICS & GYNECOLOGY

## 2024-04-02 PROCEDURE — 99212 OFFICE O/P EST SF 10 MIN: CPT | Performed by: OBSTETRICS & GYNECOLOGY

## 2024-04-02 NOTE — PROGRESS NOTES
Per Nursing Note:  Alma Ferrara is a 58 y.o. female presents for a problem visit.         Chief Complaint   Patient presents with    Post-Op Check      No LMP recorded. Patient is postmenopausal.  Birth Control: post menopausal status.  Last Pap: normal obtained 2023     The patient is here today for post op check from 3/18/24 HYSTEROSCOPY, DILATATION AND CURETTAGE, MYOMECTOMY      FINAL PATHOLOGIC DIAGNOSIS     1. Uterus, myometrial nodule, myomectomy:        Submucosal leiomyoma, multiple fragments, 4.5 cm in greatest   dimension     2. Endocervix, curettings:        Blood and scant superficial fragments of benign cervical mucosa   Negative for dysplasia, malignancy or viral cytopathic effect         OB/GYN Problem Visit        Chief Complaint   Patient presents with    Post-Op Check       HPI  Alma Ferrara is a ,  58 y.o. female who presents for a problem visit.   No LMP recorded. Patient is postmenopausal.    2wks S/P HS/myomectomy 3/18/24    Final path benign:  FINAL PATHOLOGIC DIAGNOSIS     1. Uterus, myometrial nodule, myomectomy:        Submucosal leiomyoma, multiple fragments, 4.5 cm in greatest   dimension     2. Endocervix, curettings:        Blood and scant superficial fragments of benign cervical mucosa   Negative for dysplasia, malignancy or viral cytopathic effect       Still having small amount of dark brown spotting.    Took norco first day after surgery.  Doing well now.        Past Medical History:   Diagnosis Date    Arthritis     Back pain     Depression     Heart murmur     Since birth.  Does not need SBE prophylaxis per cards    High cholesterol     HTN (hypertension)     Palpitations     Polyp of cervix 2024    negative    Routine Papanicolaou smear 2023    normal HPV neg    Screening mammogram for breast cancer 2023    BI-RADS 1: Negative. No mammographic evidence of malignancy.    Seasonal allergies     Tension headache      Past Surgical History:   Procedure

## 2024-04-19 DIAGNOSIS — I10 PRIMARY HYPERTENSION: Primary | ICD-10-CM

## 2024-04-19 DIAGNOSIS — I10 ESSENTIAL (PRIMARY) HYPERTENSION: ICD-10-CM

## 2024-04-22 RX ORDER — LISINOPRIL 5 MG/1
5 TABLET ORAL DAILY
Qty: 90 TABLET | Refills: 3 | Status: SHIPPED | OUTPATIENT
Start: 2024-04-22

## 2024-04-22 NOTE — TELEPHONE ENCOUNTER
Per VO by MD.    Future Appointments   Date Time Provider Department Center   2/12/2025 10:00 AM Arnol Owens MD [facility]

## 2024-06-13 DIAGNOSIS — E78.00 HYPERCHOLESTEREMIA: ICD-10-CM

## 2024-06-13 DIAGNOSIS — I10 PRIMARY HYPERTENSION: ICD-10-CM

## 2024-06-14 NOTE — TELEPHONE ENCOUNTER
Attempted to call. No answer. Message left.   Patient is due for an appt.   [facility] msg sent.

## 2024-06-15 RX ORDER — LEVOCETIRIZINE DIHYDROCHLORIDE 5 MG/1
5 TABLET, FILM COATED ORAL DAILY
Qty: 90 TABLET | Refills: 0 | Status: SHIPPED | OUTPATIENT
Start: 2024-06-15

## 2024-06-15 RX ORDER — ATORVASTATIN CALCIUM 40 MG/1
40 TABLET, FILM COATED ORAL DAILY
Qty: 90 TABLET | Refills: 0 | Status: SHIPPED | OUTPATIENT
Start: 2024-06-15

## 2024-06-15 RX ORDER — MONTELUKAST SODIUM 10 MG/1
10 TABLET ORAL DAILY
Qty: 90 TABLET | Refills: 0 | Status: SHIPPED | OUTPATIENT
Start: 2024-06-15

## 2024-06-15 RX ORDER — HYDROCHLOROTHIAZIDE 25 MG/1
25 TABLET ORAL DAILY
Qty: 90 TABLET | Refills: 0 | Status: SHIPPED | OUTPATIENT
Start: 2024-06-15

## 2024-06-19 ENCOUNTER — OFFICE VISIT (OUTPATIENT)
Facility: CLINIC | Age: 59
End: 2024-06-19
Payer: COMMERCIAL

## 2024-06-19 VITALS
HEART RATE: 73 BPM | DIASTOLIC BLOOD PRESSURE: 66 MMHG | HEIGHT: 69 IN | BODY MASS INDEX: 33.62 KG/M2 | OXYGEN SATURATION: 100 % | RESPIRATION RATE: 18 BRPM | SYSTOLIC BLOOD PRESSURE: 101 MMHG | TEMPERATURE: 98.4 F | WEIGHT: 227 LBS

## 2024-06-19 DIAGNOSIS — Z79.899 LONG TERM USE OF DRUG: ICD-10-CM

## 2024-06-19 DIAGNOSIS — E78.00 HYPERCHOLESTEREMIA: ICD-10-CM

## 2024-06-19 DIAGNOSIS — J30.2 SEASONAL ALLERGIES: ICD-10-CM

## 2024-06-19 DIAGNOSIS — D64.9 ANEMIA, UNSPECIFIED TYPE: ICD-10-CM

## 2024-06-19 DIAGNOSIS — E55.9 VITAMIN D DEFICIENCY: ICD-10-CM

## 2024-06-19 DIAGNOSIS — I10 PRIMARY HYPERTENSION: Primary | ICD-10-CM

## 2024-06-19 DIAGNOSIS — I44.1 HEART BLOCK AV SECOND DEGREE: ICD-10-CM

## 2024-06-19 PROCEDURE — 3074F SYST BP LT 130 MM HG: CPT | Performed by: FAMILY MEDICINE

## 2024-06-19 PROCEDURE — 99214 OFFICE O/P EST MOD 30 MIN: CPT | Performed by: FAMILY MEDICINE

## 2024-06-19 PROCEDURE — 3078F DIAST BP <80 MM HG: CPT | Performed by: FAMILY MEDICINE

## 2024-06-19 RX ORDER — ATORVASTATIN CALCIUM 40 MG/1
40 TABLET, FILM COATED ORAL DAILY
Qty: 90 TABLET | Refills: 1 | Status: SHIPPED | OUTPATIENT
Start: 2024-06-19

## 2024-06-19 RX ORDER — COVID-19 ANTIGEN TEST
KIT MISCELLANEOUS
COMMUNITY
Start: 2024-05-08

## 2024-06-19 RX ORDER — HYDROCHLOROTHIAZIDE 25 MG/1
25 TABLET ORAL DAILY
Qty: 90 TABLET | Refills: 1 | Status: SHIPPED | OUTPATIENT
Start: 2024-06-19

## 2024-06-19 RX ORDER — MONTELUKAST SODIUM 10 MG/1
10 TABLET ORAL DAILY
Qty: 90 TABLET | Refills: 1 | Status: SHIPPED | OUTPATIENT
Start: 2024-06-19

## 2024-06-19 ASSESSMENT — ENCOUNTER SYMPTOMS
COUGH: 0
SHORTNESS OF BREATH: 0

## 2024-06-19 NOTE — PROGRESS NOTES
Chief Complaint   Patient presents with    Follow-up Chronic Condition         \"Have you been to the ER, urgent care clinic since your last visit?  Hospitalized since your last visit?\"    NO    “Have you seen or consulted any other health care providers outside of [facility] since your last visit?”    NO        “Have you had a colorectal cancer screening such as a colonoscopy/FIT/Cologuard?    NO    No colonoscopy on file  No cologuard on file  Date of last FIT: 8/18/2021   No flexible sigmoidoscopy on file         Click Here for Release of Records Request     Health Maintenance Due   Topic Date Due    Colorectal Cancer Screen  08/18/2022    COVID-19 Vaccine (6 - 2023-24 season) 09/01/2023    Lipids  02/06/2024    Respiratory Syncytial Virus (RSV) Pregnant or age 60 yrs+ (1 - 1-dose 60+ series) Never done       
Never used   Substance and Sexual Activity    Alcohol use: Not Currently    Drug use: Not Currently     Types: Marijuana (Weed)     Comment: none for one year    Sexual activity: Yes     Partners: Male     Birth control/protection: Post-menopausal     Comment: Menopause     Social Determinants of Health     Financial Resource Strain: Low Risk  (8/12/2023)    Overall Financial Resource Strain (CARDIA)     Difficulty of Paying Living Expenses: Not very hard   Food Insecurity:   Recent Concern: Food Insecurity - Food Insecurity Present (8/12/2023)    Hunger Vital Sign     Worried About Running Out of Food in the Last Year: Never true     Ran Out of Food in the Last Year: Sometimes true   Transportation Needs: Unmet Transportation Needs (8/12/2023)    PRAPARE - Transportation     Lack of Transportation (Non-Medical): Yes   Intimate Partner Violence: Not At Risk (4/8/2023)    Humiliation, Afraid, Rape, and Kick questionnaire     Fear of Current or Ex-Partner: No     Emotionally Abused: No     Physically Abused: No     Sexually Abused: No   Housing Stability: Unknown (8/12/2023)    Housing Stability Vital Sign     Unstable Housing in the Last Year: No        Review of Systems   Review of Systems   Constitutional:  Positive for fatigue. Negative for fever.   Respiratory:  Negative for cough and shortness of breath.    Cardiovascular:  Negative for chest pain, palpitations and leg swelling.   Genitourinary:  Negative for vaginal bleeding.         Objective:   /66 (Site: Left Upper Arm, Position: Sitting, Cuff Size: Large Adult)   Pulse 73   Temp 98.4 °F (36.9 °C) (Oral)   Resp 18   Ht 1.753 m (5' 9\")   Wt 103 kg (227 lb)   SpO2 100%   BMI 33.52 kg/m²    Physical Exam   PHYSICAL EXAM:  Gen: Pt sitting in chair, in NAD  Head: Normocephalic, atraumatic  Eyes: Sclera anicteric, EOM grossly intact,  CVS: Normal S1, S2, KATY 2/6  Resp: CTAB, no wheezes or rales  Abd: Soft, non-tender, non-distended, +BS  Extrem:

## 2024-06-20 LAB
25(OH)D3 SERPL-MCNC: 75.1 NG/ML (ref 30–100)
ALBUMIN SERPL-MCNC: 3.2 G/DL (ref 3.5–5)
ALBUMIN/GLOB SERPL: 0.9 (ref 1.1–2.2)
ALP SERPL-CCNC: 86 U/L (ref 45–117)
ALT SERPL-CCNC: 32 U/L (ref 12–78)
ANION GAP SERPL CALC-SCNC: 5 MMOL/L (ref 5–15)
AST SERPL-CCNC: 26 U/L (ref 15–37)
BASOPHILS # BLD: 0.1 K/UL (ref 0–0.1)
BASOPHILS NFR BLD: 1 % (ref 0–1)
BILIRUB SERPL-MCNC: 0.3 MG/DL (ref 0.2–1)
BUN SERPL-MCNC: 16 MG/DL (ref 6–20)
BUN/CREAT SERPL: 20 (ref 12–20)
CALCIUM SERPL-MCNC: 9.3 MG/DL (ref 8.5–10.1)
CHLORIDE SERPL-SCNC: 107 MMOL/L (ref 97–108)
CHOLEST SERPL-MCNC: 126 MG/DL
CO2 SERPL-SCNC: 27 MMOL/L (ref 21–32)
CREAT SERPL-MCNC: 0.81 MG/DL (ref 0.55–1.02)
DIFFERENTIAL METHOD BLD: ABNORMAL
EOSINOPHIL # BLD: 0.2 K/UL (ref 0–0.4)
EOSINOPHIL NFR BLD: 3 % (ref 0–7)
ERYTHROCYTE [DISTWIDTH] IN BLOOD BY AUTOMATED COUNT: 21.8 % (ref 11.5–14.5)
GLOBULIN SER CALC-MCNC: 3.4 G/DL (ref 2–4)
GLUCOSE SERPL-MCNC: 100 MG/DL (ref 65–100)
HCT VFR BLD AUTO: 37.1 % (ref 35–47)
HDLC SERPL-MCNC: 40 MG/DL
HDLC SERPL: 3.2 (ref 0–5)
HGB BLD-MCNC: 11 G/DL (ref 11.5–16)
IMM GRANULOCYTES # BLD AUTO: 0 K/UL (ref 0–0.04)
IMM GRANULOCYTES NFR BLD AUTO: 0 % (ref 0–0.5)
LDLC SERPL CALC-MCNC: 62.2 MG/DL (ref 0–100)
LYMPHOCYTES # BLD: 2.3 K/UL (ref 0.8–3.5)
LYMPHOCYTES NFR BLD: 38 % (ref 12–49)
MCH RBC QN AUTO: 24.7 PG (ref 26–34)
MCHC RBC AUTO-ENTMCNC: 29.6 G/DL (ref 30–36.5)
MCV RBC AUTO: 83.2 FL (ref 80–99)
MONOCYTES # BLD: 0.8 K/UL (ref 0–1)
MONOCYTES NFR BLD: 13 % (ref 5–13)
NEUTS SEG # BLD: 2.6 K/UL (ref 1.8–8)
NEUTS SEG NFR BLD: 45 % (ref 32–75)
NRBC # BLD: 0 K/UL (ref 0–0.01)
NRBC BLD-RTO: 0 PER 100 WBC
PLATELET # BLD AUTO: 253 K/UL (ref 150–400)
PMV BLD AUTO: 10.4 FL (ref 8.9–12.9)
POTASSIUM SERPL-SCNC: 4 MMOL/L (ref 3.5–5.1)
PROT SERPL-MCNC: 6.6 G/DL (ref 6.4–8.2)
RBC # BLD AUTO: 4.46 M/UL (ref 3.8–5.2)
RBC MORPH BLD: ABNORMAL
RBC MORPH BLD: ABNORMAL
SODIUM SERPL-SCNC: 139 MMOL/L (ref 136–145)
TRIGL SERPL-MCNC: 119 MG/DL
VLDLC SERPL CALC-MCNC: 23.8 MG/DL
WBC # BLD AUTO: 6 K/UL (ref 3.6–11)

## 2024-07-31 LAB — HEMOCCULT STL QL IA: NEGATIVE

## 2024-09-16 RX ORDER — LEVOCETIRIZINE DIHYDROCHLORIDE 5 MG/1
5 TABLET, FILM COATED ORAL DAILY
Qty: 90 TABLET | Refills: 1 | Status: SHIPPED | OUTPATIENT
Start: 2024-09-16

## 2024-10-04 ENCOUNTER — PATIENT MESSAGE (OUTPATIENT)
Facility: CLINIC | Age: 59
End: 2024-10-04

## 2024-10-06 RX ORDER — DULOXETIN HYDROCHLORIDE 30 MG/1
30 CAPSULE, DELAYED RELEASE ORAL
Qty: 30 CAPSULE | Refills: 0 | Status: SHIPPED | OUTPATIENT
Start: 2024-10-06

## 2024-11-04 RX ORDER — PROGESTERONE 100 MG/1
100 CAPSULE ORAL NIGHTLY
Qty: 30 CAPSULE | Refills: 0 | OUTPATIENT
Start: 2024-11-04

## 2024-11-07 NOTE — TELEPHONE ENCOUNTER
60 yo last ov 4/2/24, post op check  Last ae 8/28/23    Per last ov note on 4/2/24:  PLAN:  Recommend pelvic rest for an additional week, then may resume all normal activities.  AE/mammo due in Sept, enc to schedule.  RTO sooner prn         Rx was requested and denied for PT needing an ov/ae:  Reason for Refusal: Patient needs an appointment   Reason for Refusal Comment: patient over due for annual exam   Refused By: Jodee Sommer RN     PT sent request again with note:  Patient comment: The pharmacist, told me I would have to speak to the physician, I could not have this fill. Please advise to me why         PT sent a [facility] message letting her know it is being denied because she is past due for an ae and she will need to call and schedule an ae.

## 2024-11-08 RX ORDER — PROGESTERONE 100 MG/1
100 CAPSULE ORAL NIGHTLY
Qty: 90 CAPSULE | Refills: 3 | OUTPATIENT
Start: 2024-11-08

## 2024-11-08 NOTE — TELEPHONE ENCOUNTER
This nurse attempted to reach the patient regarding the refill and left a message about need to schedule appointment for annual exam and then refill can be set to get her to her scheduled appointment    Patient has not read my chart message sent yesterday

## 2024-12-03 RX ORDER — ESTRADIOL 10 UG/1
INSERT VAGINAL
Qty: 24 TABLET | Refills: 0 | OUTPATIENT
Start: 2024-12-03

## 2024-12-10 ENCOUNTER — OFFICE VISIT (OUTPATIENT)
Age: 59
End: 2024-12-10
Payer: MEDICAID

## 2024-12-10 VITALS
DIASTOLIC BLOOD PRESSURE: 78 MMHG | HEART RATE: 82 BPM | BODY MASS INDEX: 34.56 KG/M2 | WEIGHT: 234 LBS | SYSTOLIC BLOOD PRESSURE: 133 MMHG | RESPIRATION RATE: 20 BRPM

## 2024-12-10 DIAGNOSIS — Z01.419 ENCOUNTER FOR WELL WOMAN EXAM WITH ROUTINE GYNECOLOGICAL EXAM: Primary | ICD-10-CM

## 2024-12-10 DIAGNOSIS — N95.1 MENOPAUSAL SYMPTOMS: ICD-10-CM

## 2024-12-10 DIAGNOSIS — Z79.899 HIGH RISK MEDICATION USE: ICD-10-CM

## 2024-12-10 DIAGNOSIS — Z91.89 AT HIGH RISK FOR CARDIOVASCULAR DISEASE: ICD-10-CM

## 2024-12-10 PROCEDURE — 3078F DIAST BP <80 MM HG: CPT | Performed by: OBSTETRICS & GYNECOLOGY

## 2024-12-10 PROCEDURE — 3075F SYST BP GE 130 - 139MM HG: CPT | Performed by: OBSTETRICS & GYNECOLOGY

## 2024-12-10 PROCEDURE — 99396 PREV VISIT EST AGE 40-64: CPT | Performed by: OBSTETRICS & GYNECOLOGY

## 2024-12-10 NOTE — PROGRESS NOTES
Alma Ferrara is a 59 y.o. female returns for an annual exam     Chief Complaint   Patient presents with    Annual Exam       No LMP recorded. Patient is postmenopausal.  Her periods are nonexistent in flow.  Problems: no problems  Birth Control: post menopausal status.  Last Pap: normal obtained 8/2023  She does not have a history of ELY 2, 3 or cervical cancer.   Last Mammogram: had her mammogram today in our office.   
present  Anus: anus within normal limits  Inguinal Lymph Nodes: no lymphadenopathy present    Skin  General Inspection: no rash, no lesions identified    Neurologic/Psychiatric  Mental Status:  Orientation: grossly oriented to person, place and time  Mood and Affect: mood normal, affect appropriate    No results found for this visit on 12/10/24.      Assessment & Plan:  Routine gynecologic examination.  Pap/HPV (8/28/2023) negative  Hysteroscopy/myomectomy 3/18/2024)  Menopausal sx.  58yo with multiple CV risk factors.  Discussed non-hormonal options may be safer option for her.  Interested in Veozah.  Will draw CMP as her last CMP was drawn about 6 months ago.  Veozah pamphlet given.  Also provided [employer] menopause handout and Menonotes for hormonal and non-hormonal options.  Her current medical status is satisfactory with no evidence of significant gynecologic issues.  Counseled re: diet, exercise, healthy lifestyle  Return for yearly wellness visits  Pt counseled regarding co-testing for high risk HPV with pap  Rec screening mammo.Done today in our office.     Patient verbalized understanding      Orders Placed This Encounter    Comprehensive Metabolic Panel     Standing Status:   Future     Number of Occurrences:   1     Standing Expiration Date:   12/10/2025         Please note that this dictation was completed with SpreadShout, the computer voice recognition software.  Quite often unanticipated grammatical, syntax, homophones, and other interpretive errors are inadvertently transcribed by the computer software.  Please disregard these errors.  Please excuse any errors that have escaped final proofreading.

## 2024-12-11 LAB
ALBUMIN SERPL-MCNC: 3.6 G/DL (ref 3.5–5)
ALBUMIN/GLOB SERPL: 1 (ref 1.1–2.2)
ALP SERPL-CCNC: 136 U/L (ref 45–117)
ALT SERPL-CCNC: 29 U/L (ref 12–78)
ANION GAP SERPL CALC-SCNC: 2 MMOL/L (ref 2–12)
AST SERPL-CCNC: 17 U/L (ref 15–37)
BILIRUB SERPL-MCNC: 0.3 MG/DL (ref 0.2–1)
BUN SERPL-MCNC: 14 MG/DL (ref 6–20)
BUN/CREAT SERPL: 16 (ref 12–20)
CALCIUM SERPL-MCNC: 9.7 MG/DL (ref 8.5–10.1)
CHLORIDE SERPL-SCNC: 105 MMOL/L (ref 97–108)
CO2 SERPL-SCNC: 31 MMOL/L (ref 21–32)
CREAT SERPL-MCNC: 0.85 MG/DL (ref 0.55–1.02)
GLOBULIN SER CALC-MCNC: 3.7 G/DL (ref 2–4)
GLUCOSE SERPL-MCNC: 98 MG/DL (ref 65–100)
POTASSIUM SERPL-SCNC: 4.3 MMOL/L (ref 3.5–5.1)
PROT SERPL-MCNC: 7.3 G/DL (ref 6.4–8.2)
SODIUM SERPL-SCNC: 138 MMOL/L (ref 136–145)

## 2024-12-12 RX ORDER — FEZOLINETANT 45 MG/1
1 TABLET, FILM COATED ORAL DAILY
Qty: 90 TABLET | Refills: 0 | Status: SHIPPED | OUTPATIENT
Start: 2024-12-12 | End: 2024-12-12

## 2024-12-12 RX ORDER — FEZOLINETANT 45 MG/1
1 TABLET, FILM COATED ORAL DAILY
Qty: 30 TABLET | Refills: 0 | Status: SHIPPED | OUTPATIENT
Start: 2024-12-12

## 2024-12-20 RX ORDER — DULOXETIN HYDROCHLORIDE 30 MG/1
30 CAPSULE, DELAYED RELEASE ORAL
Qty: 30 CAPSULE | Refills: 0 | Status: SHIPPED | OUTPATIENT
Start: 2024-12-20

## 2025-01-24 DIAGNOSIS — Z79.899 HIGH RISK MEDICATION USE: ICD-10-CM

## 2025-01-25 LAB
ALBUMIN SERPL-MCNC: 3.2 G/DL (ref 3.5–5)
ALBUMIN/GLOB SERPL: 0.9 (ref 1.1–2.2)
ALP SERPL-CCNC: 105 U/L (ref 45–117)
ALT SERPL-CCNC: 26 U/L (ref 12–78)
AST SERPL-CCNC: 18 U/L (ref 15–37)
BILIRUB DIRECT SERPL-MCNC: 0.1 MG/DL (ref 0–0.2)
BILIRUB SERPL-MCNC: 0.3 MG/DL (ref 0.2–1)
GLOBULIN SER CALC-MCNC: 3.7 G/DL (ref 2–4)
PROT SERPL-MCNC: 6.9 G/DL (ref 6.4–8.2)

## 2025-01-27 DIAGNOSIS — Z79.899 LONG TERM USE OF DRUG: Primary | ICD-10-CM

## 2025-01-27 DIAGNOSIS — N95.1 MENOPAUSAL SYMPTOMS: ICD-10-CM

## 2025-01-27 DIAGNOSIS — Z79.899 HIGH RISK MEDICATION USE: ICD-10-CM

## 2025-01-27 DIAGNOSIS — Z91.89 AT HIGH RISK FOR CARDIOVASCULAR DISEASE: ICD-10-CM

## 2025-01-27 RX ORDER — FEZOLINETANT 45 MG/1
1 TABLET, FILM COATED ORAL DAILY
Qty: 30 TABLET | Refills: 0 | Status: SHIPPED | OUTPATIENT
Start: 2025-01-27

## 2025-02-04 ENCOUNTER — OFFICE VISIT (OUTPATIENT)
Facility: CLINIC | Age: 60
End: 2025-02-04
Payer: COMMERCIAL

## 2025-02-04 VITALS
DIASTOLIC BLOOD PRESSURE: 66 MMHG | OXYGEN SATURATION: 99 % | BODY MASS INDEX: 35.55 KG/M2 | RESPIRATION RATE: 16 BRPM | WEIGHT: 240 LBS | TEMPERATURE: 98.5 F | HEART RATE: 80 BPM | SYSTOLIC BLOOD PRESSURE: 128 MMHG | HEIGHT: 69 IN

## 2025-02-04 DIAGNOSIS — M54.50 CHRONIC BILATERAL LOW BACK PAIN WITHOUT SCIATICA: ICD-10-CM

## 2025-02-04 DIAGNOSIS — Z00.01 ENCOUNTER FOR WELL ADULT EXAM WITH ABNORMAL FINDINGS: Primary | ICD-10-CM

## 2025-02-04 DIAGNOSIS — I10 PRIMARY HYPERTENSION: ICD-10-CM

## 2025-02-04 DIAGNOSIS — G89.29 CHRONIC BILATERAL LOW BACK PAIN WITHOUT SCIATICA: ICD-10-CM

## 2025-02-04 DIAGNOSIS — E78.00 HYPERCHOLESTEREMIA: ICD-10-CM

## 2025-02-04 DIAGNOSIS — J30.2 SEASONAL ALLERGIES: ICD-10-CM

## 2025-02-04 PROBLEM — N95.0 POSTMENOPAUSAL BLEEDING: Status: RESOLVED | Noted: 2024-02-16 | Resolved: 2025-02-04

## 2025-02-04 PROCEDURE — 99396 PREV VISIT EST AGE 40-64: CPT | Performed by: FAMILY MEDICINE

## 2025-02-04 PROCEDURE — 3074F SYST BP LT 130 MM HG: CPT | Performed by: FAMILY MEDICINE

## 2025-02-04 PROCEDURE — 3078F DIAST BP <80 MM HG: CPT | Performed by: FAMILY MEDICINE

## 2025-02-04 RX ORDER — HYDROCHLOROTHIAZIDE 25 MG/1
25 TABLET ORAL DAILY
Qty: 90 TABLET | Refills: 1 | Status: SHIPPED | OUTPATIENT
Start: 2025-02-04

## 2025-02-04 RX ORDER — ATORVASTATIN CALCIUM 40 MG/1
40 TABLET, FILM COATED ORAL DAILY
Qty: 90 TABLET | Refills: 1 | Status: SHIPPED | OUTPATIENT
Start: 2025-02-04

## 2025-02-04 RX ORDER — METHOCARBAMOL 750 MG/1
TABLET, FILM COATED ORAL 3 TIMES DAILY PRN
COMMUNITY
Start: 2025-02-03 | End: 2025-03-05

## 2025-02-04 RX ORDER — DULOXETIN HYDROCHLORIDE 60 MG/1
60 CAPSULE, DELAYED RELEASE ORAL
Qty: 90 CAPSULE | Refills: 1 | Status: SHIPPED | OUTPATIENT
Start: 2025-02-04

## 2025-02-04 RX ORDER — DICLOFENAC SODIUM 75 MG/1
TABLET, DELAYED RELEASE ORAL
COMMUNITY

## 2025-02-04 RX ORDER — LEVOCETIRIZINE DIHYDROCHLORIDE 5 MG/1
5 TABLET, FILM COATED ORAL DAILY
Qty: 90 TABLET | Refills: 1 | Status: SHIPPED | OUTPATIENT
Start: 2025-02-04

## 2025-02-04 RX ORDER — MONTELUKAST SODIUM 10 MG/1
10 TABLET ORAL DAILY
Qty: 90 TABLET | Refills: 1 | Status: SHIPPED | OUTPATIENT
Start: 2025-02-04

## 2025-02-04 SDOH — ECONOMIC STABILITY: FOOD INSECURITY: WITHIN THE PAST 12 MONTHS, THE FOOD YOU BOUGHT JUST DIDN'T LAST AND YOU DIDN'T HAVE MONEY TO GET MORE.: NEVER TRUE

## 2025-02-04 SDOH — ECONOMIC STABILITY: FOOD INSECURITY: WITHIN THE PAST 12 MONTHS, YOU WORRIED THAT YOUR FOOD WOULD RUN OUT BEFORE YOU GOT MONEY TO BUY MORE.: NEVER TRUE

## 2025-02-04 ASSESSMENT — ENCOUNTER SYMPTOMS: SHORTNESS OF BREATH: 0

## 2025-02-04 ASSESSMENT — PATIENT HEALTH QUESTIONNAIRE - PHQ9
9. THOUGHTS THAT YOU WOULD BE BETTER OFF DEAD, OR OF HURTING YOURSELF: NOT AT ALL
SUM OF ALL RESPONSES TO PHQ9 QUESTIONS 1 & 2: 1
SUM OF ALL RESPONSES TO PHQ QUESTIONS 1-9: 5
8. MOVING OR SPEAKING SO SLOWLY THAT OTHER PEOPLE COULD HAVE NOTICED. OR THE OPPOSITE, BEING SO FIGETY OR RESTLESS THAT YOU HAVE BEEN MOVING AROUND A LOT MORE THAN USUAL: NOT AT ALL
10. IF YOU CHECKED OFF ANY PROBLEMS, HOW DIFFICULT HAVE THESE PROBLEMS MADE IT FOR YOU TO DO YOUR WORK, TAKE CARE OF THINGS AT HOME, OR GET ALONG WITH OTHER PEOPLE: SOMEWHAT DIFFICULT
2. FEELING DOWN, DEPRESSED OR HOPELESS: SEVERAL DAYS
1. LITTLE INTEREST OR PLEASURE IN DOING THINGS: NOT AT ALL
4. FEELING TIRED OR HAVING LITTLE ENERGY: SEVERAL DAYS
6. FEELING BAD ABOUT YOURSELF - OR THAT YOU ARE A FAILURE OR HAVE LET YOURSELF OR YOUR FAMILY DOWN: NOT AT ALL
SUM OF ALL RESPONSES TO PHQ QUESTIONS 1-9: 5
SUM OF ALL RESPONSES TO PHQ QUESTIONS 1-9: 5
7. TROUBLE CONCENTRATING ON THINGS, SUCH AS READING THE NEWSPAPER OR WATCHING TELEVISION: SEVERAL DAYS
3. TROUBLE FALLING OR STAYING ASLEEP: MORE THAN HALF THE DAYS
5. POOR APPETITE OR OVEREATING: NOT AT ALL
SUM OF ALL RESPONSES TO PHQ QUESTIONS 1-9: 5

## 2025-02-04 ASSESSMENT — ANXIETY QUESTIONNAIRES
7. FEELING AFRAID AS IF SOMETHING AWFUL MIGHT HAPPEN: NOT AT ALL
3. WORRYING TOO MUCH ABOUT DIFFERENT THINGS: SEVERAL DAYS
2. NOT BEING ABLE TO STOP OR CONTROL WORRYING: NOT AT ALL
1. FEELING NERVOUS, ANXIOUS, OR ON EDGE: SEVERAL DAYS
5. BEING SO RESTLESS THAT IT IS HARD TO SIT STILL: NOT AT ALL
4. TROUBLE RELAXING: SEVERAL DAYS
IF YOU CHECKED OFF ANY PROBLEMS ON THIS QUESTIONNAIRE, HOW DIFFICULT HAVE THESE PROBLEMS MADE IT FOR YOU TO DO YOUR WORK, TAKE CARE OF THINGS AT HOME, OR GET ALONG WITH OTHER PEOPLE: SOMEWHAT DIFFICULT
6. BECOMING EASILY ANNOYED OR IRRITABLE: SEVERAL DAYS
GAD7 TOTAL SCORE: 4

## 2025-02-04 NOTE — ASSESSMENT & PLAN NOTE
Chronic, at goal, continue current meds  Lab Results   Component Value Date    LDL 62.2 06/19/2024         Orders:    atorvastatin (LIPITOR) 40 MG tablet; Take 1 tablet by mouth daily

## 2025-02-04 NOTE — ASSESSMENT & PLAN NOTE
Chronic, at goal, continue current meds    Orders:    hydroCHLOROthiazide (HYDRODIURIL) 25 MG tablet; Take 1 tablet by mouth daily

## 2025-02-04 NOTE — ASSESSMENT & PLAN NOTE
Chronic, at goal, continue current meds    Orders:    levocetirizine (XYZAL) 5 MG tablet; Take 1 tablet by mouth daily    montelukast (SINGULAIR) 10 MG tablet; Take 1 tablet by mouth daily
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
## 2025-02-04 NOTE — ASSESSMENT & PLAN NOTE
the following changes in treatment are made increase duloxetine, lab results and schedule of future lab studies reviewed with patient, and reviewed medications and side effects in detail    Orders:    DULoxetine (CYMBALTA) 60 MG extended release capsule; Take 1 capsule by mouth nightly

## 2025-02-25 DIAGNOSIS — Z79.899 LONG TERM USE OF DRUG: ICD-10-CM

## 2025-02-25 DIAGNOSIS — Z79.899 HIGH RISK MEDICATION USE: ICD-10-CM

## 2025-02-27 LAB
ALBUMIN SERPL-MCNC: 3.3 G/DL (ref 3.5–5)
ALBUMIN/GLOB SERPL: 0.9 (ref 1.1–2.2)
ALP SERPL-CCNC: 114 U/L (ref 45–117)
ALT SERPL-CCNC: 38 U/L (ref 12–78)
AST SERPL-CCNC: 26 U/L (ref 15–37)
BILIRUB DIRECT SERPL-MCNC: 0.1 MG/DL (ref 0–0.2)
BILIRUB SERPL-MCNC: 0.5 MG/DL (ref 0.2–1)
GLOBULIN SER CALC-MCNC: 3.6 G/DL (ref 2–4)
PROT SERPL-MCNC: 6.9 G/DL (ref 6.4–8.2)

## 2025-03-06 ENCOUNTER — TELEPHONE (OUTPATIENT)
Age: 60
End: 2025-03-06

## 2025-03-06 DIAGNOSIS — Z91.89 AT HIGH RISK FOR CARDIOVASCULAR DISEASE: ICD-10-CM

## 2025-03-06 DIAGNOSIS — N95.1 MENOPAUSAL SYMPTOMS: ICD-10-CM

## 2025-03-06 RX ORDER — FEZOLINETANT 45 MG/1
1 TABLET, FILM COATED ORAL DAILY
Qty: 30 TABLET | Refills: 0 | Status: SHIPPED | OUTPATIENT
Start: 2025-03-06

## 2025-03-06 NOTE — TELEPHONE ENCOUNTER
PT name and  verified      60 yo last ov/ae 12/10/24    PT calling, former PT of , stating she had her labs done at her Family Medicine office 25 and needs a MD to continue care with and look at her labs for her refill of her Rx Veozah.  RN informed PT of MD's seeing PT's, and PT chose .  PT was started on Veozah 2024 and had baseline labs 12/10/24. PT has also had labs 25 and now 25. PT is needing a refill until she is due for labs at the end of this month.    Please review labs/refill request  Former PT of [facility]    Thank you

## 2025-03-07 ENCOUNTER — TELEPHONE (OUTPATIENT)
Age: 60
End: 2025-03-07

## 2025-03-07 DIAGNOSIS — Z79.899 MEDICATION MANAGEMENT: Primary | ICD-10-CM

## 2025-03-07 NOTE — TELEPHONE ENCOUNTER
Appointment Request  (Newest Message First)  Hayley Mckay23 hours ago (8:11 AM)     CC  Can someone read my blood work? I know Dr. Bledsoe is not practicing at the hospital [facility] office. She told me that there will be someone to check my blood work so I can get the Veozah for my hot flashes.        Alma Butcher Ob-Gyn  StaffYesterday (7:39 AM)     AE  Can someone read my blood work? I know Dr. Bledsoe is not practicing at the hospital [facility] office. She told me that there will be someone to check my blood work so I can get the Veozah for my hot flashes.         Former [facility] patient .    Please advise regarding recent labs done on 2/25/2025 and advise. When does the patient need to have labs checked again?      Thank you

## 2025-03-07 NOTE — TELEPHONE ENCOUNTER
MD reviewed labs and sent 30 day supply of Veozah, will need labs in one month. See telephone encounter dated 3/7/25.

## 2025-03-07 NOTE — TELEPHONE ENCOUNTER
Ale Esparza MD         3/7/25  9:14 AM  Note     Medication sent yesterday. Repeat labs one month          Patient advised of MD reviewed labs, medication sent and patient to repeat labs in one month.    Patient requesting lab order to go to her PCP office in [place], Dr Vicente, fax       Order pended for hepatic function    Please review lab and amend/sign    Thank you

## 2025-03-13 ENCOUNTER — TELEPHONE (OUTPATIENT)
Age: 60
End: 2025-03-13

## 2025-03-13 NOTE — TELEPHONE ENCOUNTER
Two patient identifier used      59 year old patient last seen in the office on 12/10/2024 for ae.     Patient calling to check on the prior authorization for her medication sent on 3/6/2025  fezolinetant (VEOZAH) 45 MG TABS [[id number]]    Order Details  Dose: 1 tablet Route: Oral Frequency: DAILY   Dispense Quantity: 30 tablet Refills: 0          Sig: Take 1 tablet by mouth daily         Start Date: 03/06/25 End Date: --   Written Date: 03/06/25 Expiration Date: 03/06/26       Associated Diagnoses: Menopausal symptoms [N95.1]; At high risk for cardiovascular disease [Z91.89]   Original Order: fezolinetant (VEOZAH) 45 MG TABS [[id number]]   Providers      This nurse spoke to JAN Gilliland regarding the PA and was advised that the authorization has been submitted and now we are waiting to hear from her insurance company         Please advise when approved or not.    Thank you

## 2025-03-14 NOTE — TELEPHONE ENCOUNTER
Eugenia Gilliland to Me   TS    3/14/25  7:50 AM  Medication approved.    This nurse attempted to reach the patient and was not able to leave a voice mail message due to voice mail box is full.    Patient was sent a my chart message that her medication was approved and she should be able pick it up at the pharmacy.    My chart message sent.

## 2025-04-12 DIAGNOSIS — I10 ESSENTIAL (PRIMARY) HYPERTENSION: ICD-10-CM

## 2025-04-14 RX ORDER — LISINOPRIL 5 MG/1
5 TABLET ORAL DAILY
Qty: 90 TABLET | Refills: 0 | OUTPATIENT
Start: 2025-04-14

## 2025-04-16 ENCOUNTER — PATIENT MESSAGE (OUTPATIENT)
Facility: CLINIC | Age: 60
End: 2025-04-16

## 2025-04-16 DIAGNOSIS — I10 ESSENTIAL (PRIMARY) HYPERTENSION: ICD-10-CM

## 2025-04-16 RX ORDER — LISINOPRIL 5 MG/1
5 TABLET ORAL DAILY
Qty: 90 TABLET | Refills: 1 | Status: SHIPPED | OUTPATIENT
Start: 2025-04-16

## 2025-04-17 DIAGNOSIS — Z79.899 MEDICATION MANAGEMENT: ICD-10-CM

## 2025-04-18 LAB
ALBUMIN SERPL-MCNC: 3.4 G/DL (ref 3.5–5)
ALBUMIN/GLOB SERPL: 0.9 (ref 1.1–2.2)
ALP SERPL-CCNC: 100 U/L (ref 45–117)
ALT SERPL-CCNC: 28 U/L (ref 12–78)
AST SERPL-CCNC: 25 U/L (ref 15–37)
BILIRUB DIRECT SERPL-MCNC: 0.1 MG/DL (ref 0–0.2)
BILIRUB SERPL-MCNC: 0.4 MG/DL (ref 0.2–1)
GLOBULIN SER CALC-MCNC: 3.7 G/DL (ref 2–4)
PROT SERPL-MCNC: 7.1 G/DL (ref 6.4–8.2)

## 2025-04-21 ENCOUNTER — RESULTS FOLLOW-UP (OUTPATIENT)
Age: 60
End: 2025-04-21

## 2025-04-21 RX ORDER — FEZOLINETANT 45 MG/1
1 TABLET, FILM COATED ORAL DAILY
Qty: 90 TABLET | Refills: 0 | Status: SHIPPED | OUTPATIENT
Start: 2025-04-21

## 2025-06-27 ENCOUNTER — TELEPHONE (OUTPATIENT)
Dept: PRIMARY CARE CLINIC | Facility: CLINIC | Age: 60
End: 2025-06-27

## 2025-06-27 NOTE — TELEPHONE ENCOUNTER
----- Message from Omi MONTOYA sent at 6/27/2025  2:35 PM EDT -----  Regarding: [facility] Referral Request  [facility] Referral Request    Reason for referral request: Specialty Provider    Specialist/Lab/Test patient is requesting (if known): sports medicine doctor    Specialist Phone Number (if applicable): pt dont have it     Additional Information pt needs a referral for a sports medicine doctor  --------------------------------------------------------------------------------------------------------------------------    Relationship to Patient: Self     Call Back Information: OK to leave message on voicemail  Preferred Call Back Number: Phone  [phone]

## 2025-07-14 ENCOUNTER — TELEPHONE (OUTPATIENT)
Age: 60
End: 2025-07-14

## 2025-07-14 DIAGNOSIS — Z79.899 MEDICATION MANAGEMENT: Primary | ICD-10-CM

## 2025-07-14 RX ORDER — FEZOLINETANT 45 MG/1
1 TABLET, FILM COATED ORAL DAILY
Qty: 90 TABLET | Refills: 0 | OUTPATIENT
Start: 2025-07-14

## 2025-07-14 NOTE — TELEPHONE ENCOUNTER
Patient was called back, and  provided the information that the fax lab work has been sent to her doctors office and she can call and set up the appointment to have her lab work done.    Patient verbalized understanding.

## 2025-07-14 NOTE — TELEPHONE ENCOUNTER
Two patient identifiers used      60 year old patient last seen on 12/10/2024 for ae with Dr Hollins    Patient is transitioning to Dr. Esparza.    Patient had liver function testing done on 4/17/2025 and is calling about a refill of her       fezolinetant (VEOZAH) 45 MG TABS [[id number]]    Order Details  Dose: 1 tablet Route: Oral Frequency: DAILY   Dispense Quantity: 90 tablet Refills: 0          Sig: Take 1 tablet by mouth daily         Start Date: 04/21/25 End Date: --   Written Date: 04/21/25 Expiration Date: 04/21/26   Providers    Authorizing Provider: Ale Esparza MD NPI: [id number]   Ordering User: Ale Esparza MD          Pharmacy    [address] [phone] -  [phone]  [address] [place] [id number]  Phone: [phone]  Fax: [phone]       Please review and sign pended lab order , to be faxed to patient PCP office , [facility]      Fax number        Thank you

## 2025-07-15 DIAGNOSIS — Z79.899 MEDICATION MANAGEMENT: ICD-10-CM

## 2025-07-15 DIAGNOSIS — Z79.899 MEDICATION MANAGEMENT: Primary | ICD-10-CM

## 2025-07-15 NOTE — TELEPHONE ENCOUNTER
Lab orders have been resent as per Dr Esparza verbal order with read back.    Patient was called back to let her know that the fax has been sent with confirmation to the following fax number      Patient is checking with the doctors office to make sure that they have received the corrected lab order slip.        Patient reports that the Dr office has received the corrected lab test orders.    Patient can now schedule her appointment for lab work.    Patient verbalized understanding

## 2025-07-15 NOTE — TELEPHONE ENCOUNTER
PT calling back, name and  verified    61 yo last ov/ae 12/10/24, next ov/ae 12/15/25    PT states there was orders placed yesterday for her to go to [facility] and she called them this morning before going to get her labs done and she was told that they could not do her labs because the orders did not state \"lab collection\".  RN reviewed and relayed that we would have to locate the orders faxed yesterday, they had not been scanned into her chart yet, and BS, RN would correct and refax.  PT voices understanding.

## 2025-07-16 ENCOUNTER — TELEPHONE (OUTPATIENT)
Age: 60
End: 2025-07-16

## 2025-07-16 NOTE — TELEPHONE ENCOUNTER
60 year old patient last seen in the office on 4/21/2025 for 90 day supply    Labs ,hepatic panel pending      Please review labs when the results come back and advise regarding refill    Thank you

## 2025-07-17 LAB
ALBUMIN SERPL-MCNC: 3.5 G/DL (ref 3.5–5)
ALBUMIN/GLOB SERPL: 1.1 (ref 1.1–2.2)
ALP SERPL-CCNC: 92 U/L (ref 45–117)
ALT SERPL-CCNC: 26 U/L (ref 12–78)
AST SERPL-CCNC: 20 U/L (ref 15–37)
BILIRUB DIRECT SERPL-MCNC: 0.1 MG/DL (ref 0–0.2)
BILIRUB SERPL-MCNC: 0.6 MG/DL (ref 0.2–1)
GLOBULIN SER CALC-MCNC: 3.3 G/DL (ref 2–4)
PROT SERPL-MCNC: 6.8 G/DL (ref 6.4–8.2)

## 2025-07-18 RX ORDER — FEZOLINETANT 45 MG/1
1 TABLET, FILM COATED ORAL DAILY
Qty: 90 TABLET | Refills: 0 | OUTPATIENT
Start: 2025-07-18

## 2025-07-21 ENCOUNTER — OFFICE VISIT (OUTPATIENT)
Dept: PRIMARY CARE CLINIC | Facility: CLINIC | Age: 60
End: 2025-07-21
Payer: COMMERCIAL

## 2025-07-21 VITALS
WEIGHT: 242.5 LBS | HEART RATE: 71 BPM | TEMPERATURE: 98 F | BODY MASS INDEX: 35.92 KG/M2 | HEIGHT: 69 IN | OXYGEN SATURATION: 98 % | DIASTOLIC BLOOD PRESSURE: 83 MMHG | SYSTOLIC BLOOD PRESSURE: 152 MMHG

## 2025-07-21 DIAGNOSIS — M25.512 CHRONIC LEFT SHOULDER PAIN: Primary | ICD-10-CM

## 2025-07-21 DIAGNOSIS — G89.29 CHRONIC LEFT SHOULDER PAIN: Primary | ICD-10-CM

## 2025-07-21 PROCEDURE — 99214 OFFICE O/P EST MOD 30 MIN: CPT | Performed by: FAMILY MEDICINE

## 2025-07-21 PROCEDURE — 3077F SYST BP >= 140 MM HG: CPT | Performed by: FAMILY MEDICINE

## 2025-07-21 PROCEDURE — 20611 DRAIN/INJ JOINT/BURSA W/US: CPT | Performed by: FAMILY MEDICINE

## 2025-07-21 PROCEDURE — 3079F DIAST BP 80-89 MM HG: CPT | Performed by: FAMILY MEDICINE

## 2025-07-21 RX ORDER — TRIAMCINOLONE ACETONIDE 40 MG/ML
40 INJECTION, SUSPENSION INTRA-ARTICULAR; INTRAMUSCULAR ONCE
Status: COMPLETED | OUTPATIENT
Start: 2025-07-21 | End: 2025-07-21

## 2025-07-21 RX ORDER — TRIAMCINOLONE ACETONIDE 40 MG/ML
40 INJECTION, SUSPENSION INTRA-ARTICULAR; INTRAMUSCULAR ONCE
Status: CANCELLED | OUTPATIENT
Start: 2025-07-21 | End: 2025-07-21

## 2025-07-21 RX ORDER — CELECOXIB 200 MG/1
200 CAPSULE ORAL 2 TIMES DAILY
Qty: 60 CAPSULE | Refills: 1 | Status: SHIPPED | OUTPATIENT
Start: 2025-07-21

## 2025-07-21 RX ADMIN — TRIAMCINOLONE ACETONIDE 40 MG: 40 INJECTION, SUSPENSION INTRA-ARTICULAR; INTRAMUSCULAR at 16:25

## 2025-07-21 NOTE — PROGRESS NOTES
Have you been to the ER, urgent care clinic since your last visit?  Hospitalized since your last visit?   NO    Have you seen or consulted any other health care providers outside our system since your last visit?   NO           
(or guardian, if applicable) and other individuals in attendance at the appointment consented to the use of AI, including the recording.

## 2025-08-04 ENCOUNTER — OFFICE VISIT (OUTPATIENT)
Facility: CLINIC | Age: 60
End: 2025-08-04
Payer: COMMERCIAL

## 2025-08-04 VITALS
HEIGHT: 69 IN | DIASTOLIC BLOOD PRESSURE: 69 MMHG | TEMPERATURE: 98.4 F | RESPIRATION RATE: 17 BRPM | OXYGEN SATURATION: 99 % | HEART RATE: 69 BPM | SYSTOLIC BLOOD PRESSURE: 112 MMHG | BODY MASS INDEX: 35.84 KG/M2 | WEIGHT: 242 LBS

## 2025-08-04 DIAGNOSIS — G89.29 CHRONIC BILATERAL LOW BACK PAIN WITHOUT SCIATICA: ICD-10-CM

## 2025-08-04 DIAGNOSIS — Z12.11 SCREENING FOR COLON CANCER: ICD-10-CM

## 2025-08-04 DIAGNOSIS — M54.50 CHRONIC BILATERAL LOW BACK PAIN WITHOUT SCIATICA: ICD-10-CM

## 2025-08-04 DIAGNOSIS — E78.00 HYPERCHOLESTEREMIA: ICD-10-CM

## 2025-08-04 DIAGNOSIS — I10 PRIMARY HYPERTENSION: Primary | ICD-10-CM

## 2025-08-04 DIAGNOSIS — D64.9 ANEMIA, UNSPECIFIED TYPE: ICD-10-CM

## 2025-08-04 DIAGNOSIS — J30.2 SEASONAL ALLERGIES: ICD-10-CM

## 2025-08-04 DIAGNOSIS — Z79.899 LONG TERM USE OF DRUG: ICD-10-CM

## 2025-08-04 PROCEDURE — 99214 OFFICE O/P EST MOD 30 MIN: CPT | Performed by: FAMILY MEDICINE

## 2025-08-04 PROCEDURE — 3078F DIAST BP <80 MM HG: CPT | Performed by: FAMILY MEDICINE

## 2025-08-04 PROCEDURE — 3074F SYST BP LT 130 MM HG: CPT | Performed by: FAMILY MEDICINE

## 2025-08-04 RX ORDER — MONTELUKAST SODIUM 10 MG/1
10 TABLET ORAL DAILY
Qty: 90 TABLET | Refills: 1 | Status: SHIPPED | OUTPATIENT
Start: 2025-08-04

## 2025-08-04 RX ORDER — METHOCARBAMOL 750 MG/1
750 TABLET, FILM COATED ORAL 3 TIMES DAILY PRN
COMMUNITY
Start: 2025-06-27

## 2025-08-04 RX ORDER — DULOXETIN HYDROCHLORIDE 60 MG/1
60 CAPSULE, DELAYED RELEASE ORAL
Qty: 90 CAPSULE | Refills: 1 | Status: SHIPPED | OUTPATIENT
Start: 2025-08-04

## 2025-08-04 RX ORDER — HYDROCHLOROTHIAZIDE 25 MG/1
25 TABLET ORAL DAILY
Qty: 90 TABLET | Refills: 1 | Status: SHIPPED | OUTPATIENT
Start: 2025-08-04

## 2025-08-04 RX ORDER — ATORVASTATIN CALCIUM 40 MG/1
40 TABLET, FILM COATED ORAL DAILY
Qty: 90 TABLET | Refills: 1 | Status: SHIPPED | OUTPATIENT
Start: 2025-08-04

## 2025-08-04 ASSESSMENT — ENCOUNTER SYMPTOMS
SHORTNESS OF BREATH: 0
ABDOMINAL PAIN: 0
COUGH: 0
BLOOD IN STOOL: 0

## 2025-08-05 LAB
ALBUMIN SERPL-MCNC: 3.5 G/DL (ref 3.5–5.2)
ALBUMIN/GLOB SERPL: 1.2 (ref 1.1–2.2)
ALP SERPL-CCNC: 91 U/L (ref 35–104)
ALT SERPL-CCNC: 22 U/L (ref 10–50)
ANION GAP SERPL CALC-SCNC: 10 MMOL/L (ref 2–14)
AST SERPL-CCNC: 21 U/L (ref 10–35)
BASOPHILS # BLD: 0.05 K/UL (ref 0–0.1)
BASOPHILS NFR BLD: 0.7 % (ref 0–1)
BILIRUB SERPL-MCNC: 0.3 MG/DL (ref 0–1.2)
BUN SERPL-MCNC: 13 MG/DL (ref 8–23)
BUN/CREAT SERPL: 14 (ref 12–20)
CALCIUM SERPL-MCNC: 9.9 MG/DL (ref 8.8–10.2)
CHLORIDE SERPL-SCNC: 100 MMOL/L (ref 98–107)
CHOLEST SERPL-MCNC: 134 MG/DL (ref 0–200)
CO2 SERPL-SCNC: 27 MMOL/L (ref 20–29)
CREAT SERPL-MCNC: 0.93 MG/DL (ref 0.6–1)
DIFFERENTIAL METHOD BLD: ABNORMAL
EOSINOPHIL # BLD: 0.09 K/UL (ref 0–0.4)
EOSINOPHIL NFR BLD: 1.3 % (ref 0–7)
ERYTHROCYTE [DISTWIDTH] IN BLOOD BY AUTOMATED COUNT: 14.8 % (ref 11.5–14.5)
GLOBULIN SER CALC-MCNC: 2.9 G/DL (ref 2–4)
GLUCOSE SERPL-MCNC: 73 MG/DL (ref 65–100)
HCT VFR BLD AUTO: 40.3 % (ref 35–47)
HDLC SERPL-MCNC: 41 MG/DL (ref 40–60)
HDLC SERPL: 3.3
HGB BLD-MCNC: 12.7 G/DL (ref 11.5–16)
IMM GRANULOCYTES # BLD AUTO: 0.03 K/UL (ref 0–0.04)
IMM GRANULOCYTES NFR BLD AUTO: 0.4 % (ref 0–0.5)
LDLC SERPL CALC-MCNC: 79 MG/DL
LYMPHOCYTES # BLD: 2.22 K/UL (ref 0.8–3.5)
LYMPHOCYTES NFR BLD: 31 % (ref 12–49)
MCH RBC QN AUTO: 28.5 PG (ref 26–34)
MCHC RBC AUTO-ENTMCNC: 31.5 G/DL (ref 30–36.5)
MCV RBC AUTO: 90.6 FL (ref 80–99)
MONOCYTES # BLD: 0.86 K/UL (ref 0–1)
MONOCYTES NFR BLD: 12 % (ref 5–13)
NEUTS SEG # BLD: 3.9 K/UL (ref 1.8–8)
NEUTS SEG NFR BLD: 54.6 % (ref 32–75)
NRBC # BLD: 0 K/UL (ref 0–0.01)
NRBC BLD-RTO: 0 PER 100 WBC
PLATELET # BLD AUTO: 279 K/UL (ref 150–400)
PMV BLD AUTO: 11.2 FL (ref 8.9–12.9)
POTASSIUM SERPL-SCNC: 4.7 MMOL/L (ref 3.5–5.1)
PROT SERPL-MCNC: 6.4 G/DL (ref 6.4–8.3)
RBC # BLD AUTO: 4.45 M/UL (ref 3.8–5.2)
SODIUM SERPL-SCNC: 137 MMOL/L (ref 136–145)
TRIGL SERPL-MCNC: 68 MG/DL (ref 0–150)
VLDLC SERPL CALC-MCNC: 14 MG/DL
WBC # BLD AUTO: 7.2 K/UL (ref 3.6–11)

## 2025-08-06 ENCOUNTER — RESULTS FOLLOW-UP (OUTPATIENT)
Facility: CLINIC | Age: 60
End: 2025-08-06

## 2025-08-09 LAB — HEMOCCULT STL QL IA: NEGATIVE

## 2025-08-18 ENCOUNTER — TELEPHONE (OUTPATIENT)
Age: 60
End: 2025-08-18

## 2025-08-18 DIAGNOSIS — N95.1 MENOPAUSAL SYMPTOMS: ICD-10-CM

## 2025-08-18 DIAGNOSIS — Z91.89 AT HIGH RISK FOR CARDIOVASCULAR DISEASE: ICD-10-CM

## 2025-08-18 RX ORDER — FEZOLINETANT 45 MG/1
1 TABLET, FILM COATED ORAL DAILY
Qty: 30 TABLET | Refills: 0 | OUTPATIENT
Start: 2025-08-18

## 2025-08-20 ENCOUNTER — TELEPHONE (OUTPATIENT)
Age: 60
End: 2025-08-20

## 2025-08-20 DIAGNOSIS — Z91.89 AT HIGH RISK FOR CARDIOVASCULAR DISEASE: ICD-10-CM

## 2025-08-20 DIAGNOSIS — N95.1 MENOPAUSAL SYMPTOMS: ICD-10-CM

## 2025-08-20 RX ORDER — FEZOLINETANT 45 MG/1
1 TABLET, FILM COATED ORAL DAILY
Qty: 30 TABLET | Refills: 0 | OUTPATIENT
Start: 2025-08-20

## 2025-08-20 RX ORDER — FEZOLINETANT 45 MG/1
1 TABLET, FILM COATED ORAL DAILY
Qty: 90 TABLET | Refills: 0 | Status: SHIPPED | OUTPATIENT
Start: 2025-08-20

## (undated) DEVICE — PERI GYN-SFMC: Brand: MEDLINE INDUSTRIES, INC.

## (undated) DEVICE — DEVICE TISS REM L32CM DIA3MM S STL ULT HRD HI WR RESISTANCE

## (undated) DEVICE — FLUID MGMT SYS FLUENT KIT 6/PK

## (undated) DEVICE — DEVICE TISSUE REMOVAL XL FLUID MANAGEMENT MYOSURE

## (undated) DEVICE — BONEE® NEEDLE FOR BLADDER INJECTION CH FR 05, 22G, 35 CM, BOX OF 1: Brand: PORGES COLOPLAST

## (undated) DEVICE — GLOVE ORTHO 7   MSG9470

## (undated) DEVICE — SOLUTION IRRIG 3000ML 0.9% SOD CHL USP UROMATIC PLAS CONT

## (undated) DEVICE — SWABS COTTON OB/GYN W/RAYON TIP 8 IN 2 PER PK

## (undated) DEVICE — SET ENDOSCP SEAL HYSTEROSCOPE RIG OUTFLO CHN DISP MYOSURE